# Patient Record
Sex: MALE | Race: WHITE | NOT HISPANIC OR LATINO | Employment: OTHER | URBAN - NONMETROPOLITAN AREA
[De-identification: names, ages, dates, MRNs, and addresses within clinical notes are randomized per-mention and may not be internally consistent; named-entity substitution may affect disease eponyms.]

---

## 2017-01-06 ENCOUNTER — HOSPITAL ENCOUNTER (OUTPATIENT)
Dept: OTHER | Facility: HOSPITAL | Age: 47
Setting detail: HOSPITAL OUTPATIENT SURGERY
Discharge: HOME | End: 2017-01-06
Attending: UROLOGY | Admitting: UROLOGY

## 2017-01-06 LAB
BACTERIA #/AREA URNS AUTO: ABNORMAL /[HPF]
CLARITY UR REFRACT.AUTO: CLEAR
COLOR UR AUTO: YELLOW
EPI CELLS #/AREA URNS AUTO: ABNORMAL /HPF
GLUCOSE UR STRIP.AUTO-MCNC: NEGATIVE MG/DL
HGB UR QL STRIP.AUTO: ABNORMAL
KETONES UR STRIP.AUTO-MCNC: NEGATIVE MG/DL
LEUKOCYTE ESTERASE UR QL STRIP.AUTO: NEGATIVE
NITRITE UR QL STRIP.AUTO: NEGATIVE
PH UR STRIP.AUTO: 7 PH UNITS (ref 5–9)
PROT UR STRIP.AUTO-MCNC: NEGATIVE MG/DL
RBC #/AREA URNS AUTO: ABNORMAL /HPF
SP GR UR REFRACT.AUTO: 1.02 (ref 1–1.03)
UROBILINOGEN UR STRIP-ACNC: 0.2 EU/DL
WBC # UR AUTO: ABNORMAL /HPF

## 2017-01-09 NOTE — OP NOTE
[facility]                               REPORT OF OPERATION     NAME:  VIVIANE BACK    UNIT #:  [id number]  :  1970              ACCT #:  [id number]  ROOM:  [id number]                 SURGEON:  DREAD GONZALES MD  DICTATED:  2017   TRANSCRIBED:  2017        DATE OF PROCEDURE:  2017     PREOPERATIVE DIAGNOSIS:  Right renal stone.     POSTOPERATIVE DIAGNOSIS:  Right renal stone.     PROCEDURE PERFORMED:  Right extracorporeal shock wave lithotripsy.     ASSISTANT:  Gaby Rodriguez CSA     INDICATION:  See history.     DESCRIPTION OF PROCEDURE:  The patient was brought to the operating  suite and placed in the supine position on the lithotripsy table. In AP  and oblique planes, stone was localized. Once this was accomplished, we  used a power setting ranging between 4 and 5, with 2500 shocks with good  fragmentation of the stone. Once this was accomplished, the patient was  taken back off the table and taken to recovery, having tolerated the  procedure well.        DREAD GONZALES MD  Electronically Signed  2017 15:22:43  By MD FELIZ REYES/kiersten  [id number]  cc:   DREAD GONZALES MD                            REPORT OF OPERATION  Page #page

## 2017-02-06 ENCOUNTER — PREP FOR SURGERY (OUTPATIENT)
Dept: UROLOGY | Facility: HOSPITAL | Age: 47
End: 2017-02-06

## 2017-02-06 DIAGNOSIS — N20.1 CALCULUS OF URETER: Primary | ICD-10-CM

## 2017-02-06 RX ORDER — LEVOFLOXACIN 5 MG/ML
500 INJECTION, SOLUTION INTRAVENOUS ONCE
Status: CANCELLED | OUTPATIENT
Start: 2017-02-06 | End: 2017-02-06

## 2017-02-08 NOTE — H&P
[facility] Progress Note  VIVIANE BACK  46-year-old; :1970;;male  [address] ;[place] [address]  Ins: 1) MEDICARE  Employer: UNEMPLOYED  MRN:[id number]  DREAD PERALTA MD  [facility]  2017 10:53 AM  Allergies  morphine Unknown  Current Medications  ZyrTEC 10 mg oral tablet  Flomax 0.4 mg oral capsule 1 capsule oral  nightly  * Medications Reconciled  Problem List  Ureteric stone, NOS 2017 11:50:00 AM  Medical History  Patient non-contributory.  Surgical History  ESWL 2017  Psychiatric History  Patient is generally satisfied with life. No  depression, anxiety or thoughts of suicide.  Family History  Kidney stone  Mother Alive Father Alive Brother Alive Brother  Alive Sister Alive Sister Alive  Social History  Patient does not smoke. Patient does not drink  alcohol. Patient lives with wife.  Imm/Inj/Office Meds History Comments  Chief Complaint  Post Op Visit  History of Present Illness  Patient here following RIGHT ESWL on 17. Taking Flomax. Complains of right  flank pain, intermittent and mild, relieved with back massager. He associates the pain  with starting exercise at the gym in the last few weeks. The pain does not require the  use of Percocet that Dr. Peralta prescribed. KUB today shows 9 mm stone upper right  ureter at the level of L3 pedicles, has moved 8-10 mm distally since 17. No  fever/chills/nausea/vomiting. No UTI symptoms. Able to eat, drink, exercise. Stone  analysis results given today along with education: calcium oxalate.  Complains of fatigue, gradually worsening over the last year, worried it could be his  testosterone level. Denies depression.  Location: RIGHT kidney  Severity: mild  Onset / Duration: greater than 2 weeks  Timing: intermittent  Modifying factors: ESWL  Associated signs and symptoms: no UTI symptoms. Some intermittent flank pain.  Review of Systems  Eyes: Denies: blurry vision, pain in the eyes  and double vision  ENMT: Reports: ear pain, sore throat and sinus problems  Cardiovascular: Denies: chest pain, varicose veins, angina and syncope  Respiratory: Denies: shortness of breath, wheezing and frequent cough  Gastrointestinal: Reports: vomiting and indigestionDenies: abdominal pain, nausea  and heartburn  Genitourinary: Denies: other symptoms (see HPI)  Musculoskeletal: Denies: joint pain, neck pain and back pain  Integumentary: Denies: skin rash, boils and persistent itch  Neurological: Reports: numbness / tinglingDenies: tremors and dizzy spells  Psychiatric: Reports: generally satisfied with lifeDenies: depression, suicidal ideation  and anxiety  Endocrine: Reports: cold intolerance, heat intolerance and tired/sluggishDenies:  Excessive thirst  Hematologic/Lymphatic: Denies: swollen glands and blood clotting problem  Allergic/Immunologic: Reports: hayfever  Constitutional: Denies: fever, chills and weight loss  Vital Signs  Weight: 155 (lb) BMI: 20.45  Never smoker Height: 73 (in)  Exam  General appearance: The patient appears well developed and well nourished, in no  apparent acute distress.  Examination of pupils and irises: PERRL  Assessment of hearing: Normal.  External inspection of ears and nose: Normal.  Assessment of respiratory effort: Respiratory effort appears normal. No apparent  distress.  Examination of Extremities for edema and/or varicosities: None  Exam Continued On Next Page...  Electronically Signed By DREAD GONZALES MD on 2017 10:21 AM  Generated by [employer], a product of [employer]. [email or web address] Page 1 of 2  [facility] Progress Note  VIVIANE BACK  46-year-old; :1970;;male  [address] ;[place] [address]  Ins: 1) MEDICARE  Employer: UNEMPLOYED  MRN:[id number]  DREAD GONZALES MD  [facility]  2017 10:53 AM  Exam  Examination of abdomen: Soft benign abdomen on exam.  Obtain stool sample:  Stool specimen for occult blood is not indicated.  Examination of gait and station: Normal.  Head and neck (Assessment of range of motion): Adequate ROM noted in all  extremities.  Head and neck (Assessment of stability): Ambulates without assistance.  Inspection of skin and subcutaneous tissue: Exam of the skin is within normal limits.  The color and skin turgor are normal.  Mood and affect: Mood and affect are normal.  Orientation to time, place and person: Oriented to person, place, and time.  Data Review  Medications and chart reviewed. History and physical form/ROS reviewed and no  changes noted. Previous encounter reviewed. Last form done 12/21/16. RIGHT ESWL  ON 01/06/17  Assessment - Ureteric stone, NOS  DX:  Ureteric stone, NOS  SNO: [id number], ICD-9: 592.1, ICD-10: N20.1  Fatigue  SNO: [id number], ICD-9: 780.79, ICD-10: R54  Plan  Plan Notes:  RIGHT ESWL.  Check labs: patient c/o fatigue, address after ESWL.  Procedures:  123 POST-OP VISIT  Education:  Post Surgical Instructions, General, Adult - English  Kidney Stones - English  Discussion:  Discussed my findings and plan of action and reasoning behind decision making. All  questions were answered. FINDINGS WERE DISCUSSED WITH PATIENT. RISKS  AND BENEFITS EXPLAINED. PATIENT WISHES TO PROCEED.  Instructions:  Patient is instructed to call with any problems. Patient is instructed to call if the  condition worsens. Patient is instructed to call with any changes in condition. Patient  is instructed to call if he has any intractable pain, fever, chills, nausea, or vomiting.  Electronically Signed By DREAD GONZALES MD on 02/08/2017 10:21 AM  Generated by [employer], a product of [employer]. [email or web address] Page 2 of 2

## 2017-02-16 ENCOUNTER — ANESTHESIA EVENT (OUTPATIENT)
Dept: PERIOP | Facility: HOSPITAL | Age: 47
End: 2017-02-16

## 2017-02-17 ENCOUNTER — HOSPITAL ENCOUNTER (OUTPATIENT)
Facility: HOSPITAL | Age: 47
Setting detail: HOSPITAL OUTPATIENT SURGERY
Discharge: HOME | End: 2017-02-17
Attending: UROLOGY | Admitting: UROLOGY

## 2017-02-17 ENCOUNTER — APPOINTMENT (OUTPATIENT)
Dept: GENERAL RADIOLOGY | Facility: HOSPITAL | Age: 47
End: 2017-02-17

## 2017-02-17 ENCOUNTER — ANESTHESIA (OUTPATIENT)
Dept: PERIOP | Facility: HOSPITAL | Age: 47
End: 2017-02-17

## 2017-02-17 VITALS
WEIGHT: 250.44 LBS | BODY MASS INDEX: 33.19 KG/M2 | SYSTOLIC BLOOD PRESSURE: 128 MMHG | RESPIRATION RATE: 18 BRPM | TEMPERATURE: 97.1 F | DIASTOLIC BLOOD PRESSURE: 79 MMHG | HEIGHT: 73 IN | HEART RATE: 79 BPM | OXYGEN SATURATION: 96 %

## 2017-02-17 DIAGNOSIS — N20.1 CALCULUS OF URETER: ICD-10-CM

## 2017-02-17 LAB
BILIRUB UR QL STRIP: NEGATIVE
CLARITY UR: ABNORMAL
COLOR UR: YELLOW
GLUCOSE UR STRIP-MCNC: NEGATIVE MG/DL
HGB UR QL STRIP.AUTO: NEGATIVE
KETONES UR QL STRIP: NEGATIVE
LEUKOCYTE ESTERASE UR QL STRIP.AUTO: NEGATIVE
NITRITE UR QL STRIP: NEGATIVE
PH UR STRIP.AUTO: 7.5 [PH] (ref 5–9)
PROT UR QL STRIP: NEGATIVE
SP GR UR STRIP: 1.02 (ref 1–1.03)
UROBILINOGEN UR QL STRIP: ABNORMAL

## 2017-02-17 PROCEDURE — 74000 HC ABDOMEN KUB: CPT

## 2017-02-17 PROCEDURE — 81003 URINALYSIS AUTO W/O SCOPE: CPT | Performed by: UROLOGY

## 2017-02-17 PROCEDURE — 25010000002 MIDAZOLAM PER 1 MG: Performed by: NURSE ANESTHETIST, CERTIFIED REGISTERED

## 2017-02-17 PROCEDURE — 25010000002 LEVOFLOXACIN PER 250 MG: Performed by: UROLOGY

## 2017-02-17 PROCEDURE — 25010000002 PROPOFOL 10 MG/ML EMULSION: Performed by: NURSE ANESTHETIST, CERTIFIED REGISTERED

## 2017-02-17 PROCEDURE — 25010000002 ONDANSETRON PER 1 MG: Performed by: NURSE ANESTHETIST, CERTIFIED REGISTERED

## 2017-02-17 PROCEDURE — 25010000002 FENTANYL CITRATE (PF) 100 MCG/2ML SOLUTION: Performed by: NURSE ANESTHETIST, CERTIFIED REGISTERED

## 2017-02-17 RX ORDER — MIDAZOLAM HYDROCHLORIDE 1 MG/ML
INJECTION INTRAMUSCULAR; INTRAVENOUS AS NEEDED
Status: DISCONTINUED | OUTPATIENT
Start: 2017-02-17 | End: 2017-02-17 | Stop reason: SURG

## 2017-02-17 RX ORDER — TAMSULOSIN HYDROCHLORIDE 0.4 MG/1
1 CAPSULE ORAL NIGHTLY
COMMUNITY
End: 2017-06-05

## 2017-02-17 RX ORDER — LEVOFLOXACIN 5 MG/ML
500 INJECTION, SOLUTION INTRAVENOUS ONCE
Status: COMPLETED | OUTPATIENT
Start: 2017-02-17 | End: 2017-02-17

## 2017-02-17 RX ORDER — DIPHENHYDRAMINE HYDROCHLORIDE 50 MG/ML
12.5 INJECTION INTRAMUSCULAR; INTRAVENOUS
Status: DISCONTINUED | OUTPATIENT
Start: 2017-02-17 | End: 2017-02-17 | Stop reason: HOSPADM

## 2017-02-17 RX ORDER — FLUMAZENIL 0.1 MG/ML
0.2 INJECTION INTRAVENOUS AS NEEDED
Status: CANCELLED | OUTPATIENT
Start: 2017-02-17

## 2017-02-17 RX ORDER — HYDRALAZINE HYDROCHLORIDE 20 MG/ML
5 INJECTION INTRAMUSCULAR; INTRAVENOUS
Status: CANCELLED | OUTPATIENT
Start: 2017-02-17

## 2017-02-17 RX ORDER — ONDANSETRON 2 MG/ML
4 INJECTION INTRAMUSCULAR; INTRAVENOUS ONCE AS NEEDED
Status: DISCONTINUED | OUTPATIENT
Start: 2017-02-17 | End: 2017-02-17 | Stop reason: HOSPADM

## 2017-02-17 RX ORDER — ACETAMINOPHEN 650 MG/1
650 SUPPOSITORY RECTAL ONCE AS NEEDED
Status: DISCONTINUED | OUTPATIENT
Start: 2017-02-17 | End: 2017-02-17 | Stop reason: HOSPADM

## 2017-02-17 RX ORDER — LABETALOL HYDROCHLORIDE 5 MG/ML
5 INJECTION, SOLUTION INTRAVENOUS
Status: CANCELLED | OUTPATIENT
Start: 2017-02-17

## 2017-02-17 RX ORDER — FLUMAZENIL 0.1 MG/ML
0.2 INJECTION INTRAVENOUS AS NEEDED
Status: DISCONTINUED | OUTPATIENT
Start: 2017-02-17 | End: 2017-02-17 | Stop reason: HOSPADM

## 2017-02-17 RX ORDER — SODIUM CHLORIDE, SODIUM GLUCONATE, SODIUM ACETATE, POTASSIUM CHLORIDE, AND MAGNESIUM CHLORIDE 526; 502; 368; 37; 30 MG/100ML; MG/100ML; MG/100ML; MG/100ML; MG/100ML
1000 INJECTION, SOLUTION INTRAVENOUS CONTINUOUS PRN
Status: DISCONTINUED | OUTPATIENT
Start: 2017-02-17 | End: 2017-02-17 | Stop reason: HOSPADM

## 2017-02-17 RX ORDER — LABETALOL HYDROCHLORIDE 5 MG/ML
5 INJECTION, SOLUTION INTRAVENOUS
Status: DISCONTINUED | OUTPATIENT
Start: 2017-02-17 | End: 2017-02-17 | Stop reason: HOSPADM

## 2017-02-17 RX ORDER — FENTANYL CITRATE 50 UG/ML
INJECTION, SOLUTION INTRAMUSCULAR; INTRAVENOUS AS NEEDED
Status: DISCONTINUED | OUTPATIENT
Start: 2017-02-17 | End: 2017-02-17 | Stop reason: SURG

## 2017-02-17 RX ORDER — ONDANSETRON 2 MG/ML
4 INJECTION INTRAMUSCULAR; INTRAVENOUS ONCE AS NEEDED
Status: CANCELLED | OUTPATIENT
Start: 2017-02-17

## 2017-02-17 RX ORDER — GUAIFENESIN 600 MG/1
600 TABLET, EXTENDED RELEASE ORAL DAILY
COMMUNITY
End: 2017-06-05

## 2017-02-17 RX ORDER — ACETAMINOPHEN 325 MG/1
650 TABLET ORAL ONCE AS NEEDED
Status: CANCELLED | OUTPATIENT
Start: 2017-02-17

## 2017-02-17 RX ORDER — PROPOFOL 10 MG/ML
VIAL (ML) INTRAVENOUS AS NEEDED
Status: DISCONTINUED | OUTPATIENT
Start: 2017-02-17 | End: 2017-02-17 | Stop reason: SURG

## 2017-02-17 RX ORDER — NALOXONE HCL 0.4 MG/ML
0.2 VIAL (ML) INJECTION AS NEEDED
Status: DISCONTINUED | OUTPATIENT
Start: 2017-02-17 | End: 2017-02-17 | Stop reason: HOSPADM

## 2017-02-17 RX ORDER — ACETAMINOPHEN 650 MG/1
650 SUPPOSITORY RECTAL ONCE AS NEEDED
Status: CANCELLED | OUTPATIENT
Start: 2017-02-17

## 2017-02-17 RX ORDER — DIPHENHYDRAMINE HYDROCHLORIDE 50 MG/ML
12.5 INJECTION INTRAMUSCULAR; INTRAVENOUS
Status: CANCELLED | OUTPATIENT
Start: 2017-02-17

## 2017-02-17 RX ORDER — LIDOCAINE HYDROCHLORIDE 20 MG/ML
INJECTION, SOLUTION INFILTRATION; PERINEURAL AS NEEDED
Status: DISCONTINUED | OUTPATIENT
Start: 2017-02-17 | End: 2017-02-17 | Stop reason: SURG

## 2017-02-17 RX ORDER — NALOXONE HCL 0.4 MG/ML
0.2 VIAL (ML) INJECTION AS NEEDED
Status: CANCELLED | OUTPATIENT
Start: 2017-02-17

## 2017-02-17 RX ORDER — METOCLOPRAMIDE HYDROCHLORIDE 5 MG/ML
10 INJECTION INTRAMUSCULAR; INTRAVENOUS ONCE AS NEEDED
Status: DISCONTINUED | OUTPATIENT
Start: 2017-02-17 | End: 2017-02-17 | Stop reason: HOSPADM

## 2017-02-17 RX ORDER — ACETAMINOPHEN 325 MG/1
650 TABLET ORAL ONCE AS NEEDED
Status: DISCONTINUED | OUTPATIENT
Start: 2017-02-17 | End: 2017-02-17 | Stop reason: HOSPADM

## 2017-02-17 RX ORDER — MORPHINE SULFATE 2 MG/ML
2 INJECTION, SOLUTION INTRAMUSCULAR; INTRAVENOUS
Status: CANCELLED | OUTPATIENT
Start: 2017-02-17 | End: 2017-02-17

## 2017-02-17 RX ORDER — HYDRALAZINE HYDROCHLORIDE 20 MG/ML
5 INJECTION INTRAMUSCULAR; INTRAVENOUS
Status: DISCONTINUED | OUTPATIENT
Start: 2017-02-17 | End: 2017-02-17 | Stop reason: HOSPADM

## 2017-02-17 RX ORDER — ONDANSETRON 2 MG/ML
INJECTION INTRAMUSCULAR; INTRAVENOUS AS NEEDED
Status: DISCONTINUED | OUTPATIENT
Start: 2017-02-17 | End: 2017-02-17 | Stop reason: SURG

## 2017-02-17 RX ORDER — METOCLOPRAMIDE HYDROCHLORIDE 5 MG/ML
10 INJECTION INTRAMUSCULAR; INTRAVENOUS ONCE AS NEEDED
Status: CANCELLED | OUTPATIENT
Start: 2017-02-17

## 2017-02-17 RX ORDER — DEXAMETHASONE SODIUM PHOSPHATE 4 MG/ML
8 INJECTION, SOLUTION INTRA-ARTICULAR; INTRALESIONAL; INTRAMUSCULAR; INTRAVENOUS; SOFT TISSUE ONCE AS NEEDED
Status: DISCONTINUED | OUTPATIENT
Start: 2017-02-17 | End: 2017-02-17 | Stop reason: HOSPADM

## 2017-02-17 RX ADMIN — PROPOFOL 200 MG: 10 INJECTION, EMULSION INTRAVENOUS at 16:00

## 2017-02-17 RX ADMIN — LIDOCAINE HYDROCHLORIDE 100 MG: 20 INJECTION, SOLUTION INFILTRATION; PERINEURAL at 16:00

## 2017-02-17 RX ADMIN — ONDANSETRON 4 MG: 2 INJECTION INTRAMUSCULAR; INTRAVENOUS at 16:00

## 2017-02-17 RX ADMIN — FENTANYL CITRATE 25 MCG: 50 INJECTION, SOLUTION INTRAMUSCULAR; INTRAVENOUS at 16:00

## 2017-02-17 RX ADMIN — MIDAZOLAM 2 MG: 1 INJECTION INTRAMUSCULAR; INTRAVENOUS at 15:52

## 2017-02-17 RX ADMIN — LEVOFLOXACIN 500 MG: 5 INJECTION, SOLUTION INTRAVENOUS at 15:59

## 2017-02-17 RX ADMIN — SODIUM CHLORIDE, SODIUM GLUCONATE, SODIUM ACETATE, POTASSIUM CHLORIDE, AND MAGNESIUM CHLORIDE 1000 ML: 526; 502; 368; 37; 30 INJECTION, SOLUTION INTRAVENOUS at 14:08

## 2017-02-17 NOTE — ANESTHESIA PREPROCEDURE EVALUATION
Anesthesia Evaluation     Patient summary reviewed and Nursing notes reviewed   NPO Status: > 8 hours   Airway   Mallampati: III  no difficulty expected  Dental      Pulmonary - negative pulmonary ROS    breath sounds clear to auscultation  Cardiovascular - negative cardio ROS    Rhythm: regular        Neuro/Psych- negative ROS  GI/Hepatic/Renal/Endo    (+) obesity,      ROS Comment: uretereal calculus    Musculoskeletal (-) negative ROS    Abdominal    Substance History - negative use     OB/GYN negative ob/gyn ROS         Other                                    Anesthesia Plan    ASA 2     general     intravenous induction   Anesthetic plan and risks discussed with patient.

## 2017-02-17 NOTE — PLAN OF CARE
Problem: Patient Care Overview (Adult)  Goal: Plan of Care Review  Outcome: Outcome(s) achieved Date Met:  02/17/17  Discharge criteria met    Problem: Perioperative Period (Adult)  Goal: Signs and Symptoms of Listed Potential Problems Will be Absent or Manageable (Perioperative Period)  Outcome: Outcome(s) achieved Date Met:  02/17/17

## 2017-02-17 NOTE — OP NOTE
Preoperative diagnosis:  Right proximal ureteral stone 8 mm     Postop diagnosis:  Same     Procedure : Right ESWL    Surgeon:  Brayan Peralta     Anesthesia: General     Indications: See history    Procedure:  Patient was brought to the operating room and placed on the lithotripsy table.  With the fluoroscopy AP and lateral planes on the lithotripsy we found the stone and applied 3000 shocks at a power setting ranging between 4 and 6 with good fragmentation of the stone.  The patient tolerated the procedure well.  At termination of this patient was taken back off the table back to recovery having tolerated the procedure well.     Brayan Peralta M.D.

## 2017-02-17 NOTE — ANESTHESIA POSTPROCEDURE EVALUATION
Patient: Darvin Hoang    Procedure Summary     Date Anesthesia Start Anesthesia Stop Room / Location    02/17/17 1554  [facility] OR 06 / [facility] OR       Procedure Diagnosis Surgeon Provider    RIGHT EXTRACORPOREAL SHOCKWAVE LITHOTRIPSY (Right ) Calculus of ureter  (Calculus of ureter [N20.1]) MD Eitan Juarez MD          Anesthesia Type: general  Last vitals  BP      Temp      Pulse     Resp      SpO2        Post Anesthesia Care and Evaluation    Patient location during evaluation: bedside  Patient participation: complete - patient cannot participate  Level of consciousness: responsive to verbal stimuli  Pain management: adequate  Airway patency: patent  Anesthetic complications: No anesthetic complications    Cardiovascular status: acceptable  Respiratory status: acceptable  Hydration status: acceptable

## 2017-02-17 NOTE — BRIEF OP NOTE
EXTRACORPOREAL SHOCKWAVE LITHOTRIPSY WITH STENT INSERTION/REMOVAL  Procedure Note    Darvin Hoang  2/17/2017    Pre-op Diagnosis:   Calculus of ureter [N20.1]    Post-op Diagnosis:     Post-Op Diagnosis Codes:     * Calculus of ureter [N20.1]    Procedure/CPT® Codes:      Procedure(s):  RIGHT EXTRACORPOREAL SHOCKWAVE LITHOTRIPSY    Surgeon(s):  Brayan Peralta MD    Anesthesia: General    Staff:   Circulator: hSaila Ordoñez RN  Scrub Person: Jocelyn Moreira    Estimated Blood Loss: * No values recorded between 2/17/2017  3:53 PM and 2/17/2017  4:33 PM *    Specimens:                * No specimens in log *      Drains:           Findings: Same    Complications: None      Brayan Peralta MD     Date: 2/17/2017  Time: 4:51 PM

## 2017-02-17 NOTE — ANESTHESIA PROCEDURE NOTES
Airway  Urgency: elective    Airway not difficult    General Information and Staff    Patient location during procedure: OR  CRNA: DONA MURPHY    Indications and Patient Condition  Indications for airway management: airway protection    Preoxygenated: yes  MILS maintained throughout  Mask difficulty assessment: 0 - not attempted    Final Airway Details  Final airway type: supraglottic airway      Successful airway: classic  Size 4    Number of attempts at approach: 1

## 2017-02-17 NOTE — PLAN OF CARE
Problem: Patient Care Overview (Adult)  Goal: Plan of Care Review  Outcome: Ongoing (interventions implemented as appropriate)    02/17/17 2378   Coping/Psychosocial Response Interventions   Plan Of Care Reviewed With patient   Patient Care Overview   Progress improving   Outcome Evaluation   Outcome Summary/Follow up Plan pt awake, VSS on room air, denies having pain or nausea, ready for phase II.         Problem: Perioperative Period (Adult)  Goal: Signs and Symptoms of Listed Potential Problems Will be Absent or Manageable (Perioperative Period)  Outcome: Ongoing (interventions implemented as appropriate)

## 2017-03-30 ENCOUNTER — CLINICAL SUPPORT (OUTPATIENT)
Dept: PULMONOLOGY | Facility: CLINIC | Age: 47
End: 2017-03-30

## 2017-03-30 DIAGNOSIS — J30.89 ENVIRONMENTAL AND SEASONAL ALLERGIES: Primary | ICD-10-CM

## 2017-03-30 PROCEDURE — 95165 ANTIGEN THERAPY SERVICES: CPT | Performed by: INTERNAL MEDICINE

## 2017-06-05 ENCOUNTER — OFFICE VISIT (OUTPATIENT)
Dept: PULMONOLOGY | Facility: CLINIC | Age: 47
End: 2017-06-05

## 2017-06-05 VITALS
OXYGEN SATURATION: 96 % | WEIGHT: 232 LBS | BODY MASS INDEX: 30.75 KG/M2 | HEART RATE: 81 BPM | DIASTOLIC BLOOD PRESSURE: 77 MMHG | HEIGHT: 73 IN | SYSTOLIC BLOOD PRESSURE: 120 MMHG

## 2017-06-05 DIAGNOSIS — J30.2 SEASONAL ALLERGIC RHINITIS, UNSPECIFIED ALLERGIC RHINITIS TRIGGER: Primary | ICD-10-CM

## 2017-06-05 PROCEDURE — 99213 OFFICE O/P EST LOW 20 MIN: CPT | Performed by: INTERNAL MEDICINE

## 2017-06-05 NOTE — PROGRESS NOTES
"This gentleman has long-standing allergic rhinitis.  He takes immunotherapy in his symptoms are somewhat worse than usual.  He is not producing purulent sputum    ROS    Constitutional-no night sweats weight loss headaches  GI no abdominal pain nausea or diarrhea  Neuro no seizure or neurologic deficits  Musculoskeletal no deformity or joint pain   no dysuria or hematuria  Skin no rash or other lesions  All other systems reviewed and were negative except for the above.      Physical Exam  /77 (BP Location: Left arm, Patient Position: Sitting, Cuff Size: Adult)  Pulse 81  Ht 73\" (185.4 cm)  Wt 232 lb (105 kg)  SpO2 96%  BMI 30.61 kg/m2  Vital signs as above  Pupils equally round and reactive to light and accommodation, neck no JVD or adenopathy.  Cardiovascular regular rhythm and rate no murmur or gallop.  Abdomen soft no organomegaly tenderness.  Extremities no clubbing cyanosis or edema.  No cervical adenopathy.  No skin rash.  Neurologic good strength bilaterally without deficits  Nose is mildly congested throat and lungs are clear    Impression allergic rhinitis    Plan continue present medications, continue immunotherapy, return in a year  "

## 2017-06-06 ENCOUNTER — CLINICAL SUPPORT (OUTPATIENT)
Dept: PULMONOLOGY | Facility: CLINIC | Age: 47
End: 2017-06-06

## 2017-06-06 DIAGNOSIS — J30.2 SEASONAL ALLERGIC RHINITIS, UNSPECIFIED ALLERGIC RHINITIS TRIGGER: ICD-10-CM

## 2017-06-06 PROCEDURE — 95165 ANTIGEN THERAPY SERVICES: CPT | Performed by: INTERNAL MEDICINE

## 2017-06-06 NOTE — PROGRESS NOTES
We mailed the patient 10 units of allergy serum, patient is to call 10 days prior to needing next refill.

## 2017-08-15 ENCOUNTER — TELEPHONE (OUTPATIENT)
Dept: PULMONOLOGY | Facility: CLINIC | Age: 47
End: 2017-08-15

## 2017-08-15 ENCOUNTER — OFFICE VISIT (OUTPATIENT)
Dept: PULMONOLOGY | Facility: CLINIC | Age: 47
End: 2017-08-15

## 2017-08-15 VITALS
SYSTOLIC BLOOD PRESSURE: 121 MMHG | DIASTOLIC BLOOD PRESSURE: 79 MMHG | HEIGHT: 73 IN | HEART RATE: 95 BPM | OXYGEN SATURATION: 96 %

## 2017-08-15 DIAGNOSIS — J01.00 ACUTE NON-RECURRENT MAXILLARY SINUSITIS: Primary | ICD-10-CM

## 2017-08-15 DIAGNOSIS — J30.2 SEASONAL ALLERGIC RHINITIS, UNSPECIFIED ALLERGIC RHINITIS TRIGGER: ICD-10-CM

## 2017-08-15 PROCEDURE — 96372 THER/PROPH/DIAG INJ SC/IM: CPT | Performed by: INTERNAL MEDICINE

## 2017-08-15 PROCEDURE — 99213 OFFICE O/P EST LOW 20 MIN: CPT | Performed by: INTERNAL MEDICINE

## 2017-08-15 RX ORDER — PREDNISONE 10 MG/1
TABLET ORAL
Qty: 21 TABLET | Refills: 0 | Status: SHIPPED | OUTPATIENT
Start: 2017-08-15 | End: 2017-10-25

## 2017-08-15 RX ORDER — AMOXICILLIN AND CLAVULANATE POTASSIUM 875; 125 MG/1; MG/1
TABLET, FILM COATED ORAL
Qty: 20 TABLET | Refills: 0 | Status: SHIPPED | OUTPATIENT
Start: 2017-08-15 | End: 2017-10-25

## 2017-08-15 RX ORDER — METHYLPREDNISOLONE ACETATE 40 MG/ML
80 INJECTION, SUSPENSION INTRA-ARTICULAR; INTRALESIONAL; INTRAMUSCULAR; SOFT TISSUE ONCE
Status: COMPLETED | OUTPATIENT
Start: 2017-08-15 | End: 2017-08-15

## 2017-08-15 RX ADMIN — METHYLPREDNISOLONE ACETATE 80 MG: 40 INJECTION, SUSPENSION INTRA-ARTICULAR; INTRALESIONAL; INTRAMUSCULAR; SOFT TISSUE at 14:31

## 2017-08-15 NOTE — PROGRESS NOTES
"This gentleman has long-standing allergic rhinitis.  He's had some local reactions to immunotherapy.  He has had swelling of his arm and rash with no difficulty breathing or swallowing.  He has noted purulent nasal drainage facial discomfort for several days    ROS    Constitutional-no night sweats weight loss headaches  GI no abdominal pain nausea or diarrhea  Neuro no seizure or neurologic deficits  Musculoskeletal no deformity or joint pain   no dysuria or hematuria  Skin no rash or other lesions  All other systems reviewed and were negative except for the above.      Physical Exam  /79  Pulse 95  Ht 73\" (185.4 cm)  SpO2 96%  Vital signs as above  Pupils equally round and reactive to light and accommodation, neck no JVD or adenopathy.  Cardiovascular regular rhythm and rate no murmur or gallop.  Abdomen soft no organomegaly tenderness.  Extremities no clubbing cyanosis or edema.  No cervical adenopathy.  No skin rash.  Neurologic good strength bilaterally without deficits  Nose is congested mild percussion tenderness over the maxillary sinuses  Lungs are clear    Impression allergic rhinitis, reactions to immunotherapy, acute maxillary sinusitis, exacerbation of allergic rhinitis    Plan Depo-Medrol, prednisone, Augmentin, reduce immunotherapy to dilution to, return in a few weeks  "

## 2017-08-15 NOTE — TELEPHONE ENCOUNTER
Pt called about his allergy serum he had questions about why he went from red top to blue top I explained to the pt why he was getting the blue top and he says since getting the blue top he has been having rashes after the shot the pt is coming in to be seen and will discuss allergy serum with pt.

## 2017-08-16 ENCOUNTER — CLINICAL SUPPORT (OUTPATIENT)
Dept: PULMONOLOGY | Facility: CLINIC | Age: 47
End: 2017-08-16

## 2017-08-16 DIAGNOSIS — J30.2 SEASONAL ALLERGIC RHINITIS, UNSPECIFIED ALLERGIC RHINITIS TRIGGER: ICD-10-CM

## 2017-08-16 PROCEDURE — 95165 ANTIGEN THERAPY SERVICES: CPT | Performed by: INTERNAL MEDICINE

## 2017-10-18 ENCOUNTER — CLINICAL SUPPORT (OUTPATIENT)
Dept: PULMONOLOGY | Facility: CLINIC | Age: 47
End: 2017-10-18

## 2017-10-18 DIAGNOSIS — J30.2 SEASONAL ALLERGIC RHINITIS, UNSPECIFIED CHRONICITY, UNSPECIFIED TRIGGER: ICD-10-CM

## 2017-10-18 PROCEDURE — 95165 ANTIGEN THERAPY SERVICES: CPT | Performed by: INTERNAL MEDICINE

## 2017-10-25 ENCOUNTER — OFFICE VISIT (OUTPATIENT)
Dept: PULMONOLOGY | Facility: CLINIC | Age: 47
End: 2017-10-25

## 2017-10-25 VITALS
BODY MASS INDEX: 30.72 KG/M2 | HEIGHT: 73 IN | HEART RATE: 74 BPM | WEIGHT: 231.8 LBS | DIASTOLIC BLOOD PRESSURE: 82 MMHG | OXYGEN SATURATION: 98 % | SYSTOLIC BLOOD PRESSURE: 128 MMHG

## 2017-10-25 DIAGNOSIS — J30.89 CHRONIC NON-SEASONAL ALLERGIC RHINITIS, UNSPECIFIED TRIGGER: ICD-10-CM

## 2017-10-25 DIAGNOSIS — J01.01 ACUTE RECURRENT MAXILLARY SINUSITIS: Primary | ICD-10-CM

## 2017-10-25 PROCEDURE — 96372 THER/PROPH/DIAG INJ SC/IM: CPT | Performed by: INTERNAL MEDICINE

## 2017-10-25 PROCEDURE — 99213 OFFICE O/P EST LOW 20 MIN: CPT | Performed by: INTERNAL MEDICINE

## 2017-10-25 RX ORDER — METHYLPREDNISOLONE ACETATE 80 MG/ML
80 INJECTION, SUSPENSION INTRA-ARTICULAR; INTRALESIONAL; INTRAMUSCULAR; SOFT TISSUE ONCE
Status: COMPLETED | OUTPATIENT
Start: 2017-10-25 | End: 2017-10-25

## 2017-10-25 RX ORDER — PREDNISONE 10 MG/1
TABLET ORAL
Qty: 21 TABLET | Refills: 0 | Status: SHIPPED | OUTPATIENT
Start: 2017-10-25 | End: 2018-06-14

## 2017-10-25 RX ORDER — METHYLPREDNISOLONE ACETATE 80 MG/ML
80 INJECTION, SUSPENSION INTRA-ARTICULAR; INTRALESIONAL; INTRAMUSCULAR; SOFT TISSUE ONCE
Status: DISCONTINUED | OUTPATIENT
Start: 2017-10-25 | End: 2017-10-25

## 2017-10-25 RX ORDER — AZITHROMYCIN 250 MG/1
TABLET, FILM COATED ORAL
Qty: 6 TABLET | Refills: 1 | Status: SHIPPED | OUTPATIENT
Start: 2017-10-25 | End: 2018-01-19

## 2017-10-25 RX ADMIN — METHYLPREDNISOLONE ACETATE 80 MG: 80 INJECTION, SUSPENSION INTRA-ARTICULAR; INTRALESIONAL; INTRAMUSCULAR; SOFT TISSUE at 10:23

## 2017-10-25 NOTE — PROGRESS NOTES
"This gentleman complains of facial discomfort and purulent nasal drainage for several days    ROS    Constitutional-no night sweats weight loss headaches  GI no abdominal pain nausea or diarrhea  Neuro no seizure or neurologic deficits  Musculoskeletal no deformity or joint pain   no dysuria or hematuria  Skin no rash or other lesions  All other systems reviewed and were negative except for the above.      Physical Exam  /82 (BP Location: Right arm, Patient Position: Sitting, Cuff Size: Adult)  Pulse 74  Ht 73\" (185.4 cm)  Wt 231 lb 12.8 oz (105 kg)  SpO2 98%  BMI 30.58 kg/m2  Vital signs as above  Pupils equally round and reactive to light and accommodation, neck no JVD or adenopathy.  Cardiovascular regular rhythm and rate no murmur or gallop.  Abdomen soft no organomegaly tenderness.  Extremities no clubbing cyanosis or edema.  No cervical adenopathy.  No skin rash.  Neurologic good strength bilaterally without deficits  Nose is congested, throat injected, percussion tenderness over both maxillary sinuses, lungs are clear    Impression allergic rhinitis with bilateral maxillary sinusitis    Plan Depo-Medrol, Zithromax, prednisone, continue routine meds, return as needed          This document has been electronically signed by Trenton Romero MD on October 25, 2017 10:05 AM      "

## 2017-11-17 ENCOUNTER — TELEPHONE (OUTPATIENT)
Dept: PULMONOLOGY | Facility: CLINIC | Age: 47
End: 2017-11-17

## 2017-11-21 ENCOUNTER — CLINICAL SUPPORT (OUTPATIENT)
Dept: PULMONOLOGY | Facility: CLINIC | Age: 47
End: 2017-11-21

## 2017-11-21 DIAGNOSIS — J30.2 SEASONAL ALLERGIC RHINITIS, UNSPECIFIED CHRONICITY, UNSPECIFIED TRIGGER: ICD-10-CM

## 2017-11-21 PROCEDURE — 95165 ANTIGEN THERAPY SERVICES: CPT | Performed by: INTERNAL MEDICINE

## 2018-01-19 ENCOUNTER — OFFICE VISIT (OUTPATIENT)
Dept: PULMONOLOGY | Facility: CLINIC | Age: 48
End: 2018-01-19

## 2018-01-19 VITALS
HEART RATE: 74 BPM | OXYGEN SATURATION: 95 % | SYSTOLIC BLOOD PRESSURE: 135 MMHG | DIASTOLIC BLOOD PRESSURE: 88 MMHG | HEIGHT: 73 IN | WEIGHT: 240.8 LBS | BODY MASS INDEX: 31.91 KG/M2

## 2018-01-19 DIAGNOSIS — J30.89 CHRONIC NON-SEASONAL ALLERGIC RHINITIS, UNSPECIFIED TRIGGER: Primary | ICD-10-CM

## 2018-01-19 DIAGNOSIS — J01.00 SUBACUTE MAXILLARY SINUSITIS: ICD-10-CM

## 2018-01-19 DIAGNOSIS — H66.005 RECURRENT ACUTE SUPPURATIVE OTITIS MEDIA WITHOUT SPONTANEOUS RUPTURE OF LEFT TYMPANIC MEMBRANE: ICD-10-CM

## 2018-01-19 PROCEDURE — 96372 THER/PROPH/DIAG INJ SC/IM: CPT | Performed by: INTERNAL MEDICINE

## 2018-01-19 PROCEDURE — 99213 OFFICE O/P EST LOW 20 MIN: CPT | Performed by: INTERNAL MEDICINE

## 2018-01-19 RX ORDER — AMOXICILLIN AND CLAVULANATE POTASSIUM 875; 125 MG/1; MG/1
TABLET, FILM COATED ORAL
Qty: 20 TABLET | Refills: 1 | Status: SHIPPED | OUTPATIENT
Start: 2018-01-19 | End: 2018-04-16

## 2018-01-19 RX ORDER — METHYLPREDNISOLONE ACETATE 40 MG/ML
80 INJECTION, SUSPENSION INTRA-ARTICULAR; INTRALESIONAL; INTRAMUSCULAR; SOFT TISSUE ONCE
Status: COMPLETED | OUTPATIENT
Start: 2018-01-19 | End: 2018-01-19

## 2018-01-19 RX ORDER — PREDNISONE 10 MG/1
TABLET ORAL
Qty: 21 TABLET | Refills: 0 | Status: SHIPPED | OUTPATIENT
Start: 2018-01-19 | End: 2018-06-14

## 2018-01-19 RX ADMIN — METHYLPREDNISOLONE ACETATE 80 MG: 40 INJECTION, SUSPENSION INTRA-ARTICULAR; INTRALESIONAL; INTRAMUSCULAR; SOFT TISSUE at 15:05

## 2018-01-19 NOTE — PROGRESS NOTES
"This gentleman has allergic rhinitis with recurring episodes of purulent rhinitis sinusitis and otitis.  He complains of purulent nasal drainage facial discomfort and pain in his left ear for several days    ROS    Constitutional-no night sweats weight loss headaches  GI no abdominal pain nausea or diarrhea  Neuro no seizure or neurologic deficits  Musculoskeletal no deformity or joint pain   no dysuria or hematuria  Skin no rash or other lesions  All other systems reviewed and were negative except for the above.      Physical Exam  /88 (BP Location: Left arm, Patient Position: Sitting, Cuff Size: Adult)  Pulse 74  Ht 185.4 cm (73\")  Wt 109 kg (240 lb 12.8 oz)  SpO2 95%  BMI 31.77 kg/m2  Vital signs as above  Pupils equally round and reactive to light and accommodation, neck no JVD or adenopathy.  Cardiovascular regular rhythm and rate no murmur or gallop.  Abdomen soft no organomegaly tenderness.  Extremities no clubbing cyanosis or edema.  No cervical adenopathy.  No skin rash.  Neurologic good strength bilaterally without deficits  Nose throat are injected and congested, left eardrum reveals inflammation    Impression allergic rhinitis exacerbation, maxillary sinusitis, acute otitis media    Plan Depo-Medrol, Augmentin, prednisone, return as needed          This document has been electronically signed by Trenton Romero MD on January 19, 2018 2:39 PM      "

## 2018-04-16 ENCOUNTER — OFFICE VISIT (OUTPATIENT)
Dept: PULMONOLOGY | Facility: CLINIC | Age: 48
End: 2018-04-16

## 2018-04-16 ENCOUNTER — APPOINTMENT (OUTPATIENT)
Dept: LAB | Facility: HOSPITAL | Age: 48
End: 2018-04-16

## 2018-04-16 VITALS
HEART RATE: 67 BPM | HEIGHT: 73 IN | WEIGHT: 238.2 LBS | BODY MASS INDEX: 31.57 KG/M2 | OXYGEN SATURATION: 96 % | DIASTOLIC BLOOD PRESSURE: 80 MMHG | SYSTOLIC BLOOD PRESSURE: 126 MMHG

## 2018-04-16 DIAGNOSIS — J30.1 ALLERGIC RHINITIS DUE TO POLLEN, UNSPECIFIED CHRONICITY, UNSPECIFIED SEASONALITY: Primary | ICD-10-CM

## 2018-04-16 PROCEDURE — 86003 ALLG SPEC IGE CRUDE XTRC EA: CPT | Performed by: INTERNAL MEDICINE

## 2018-04-16 PROCEDURE — 36415 COLL VENOUS BLD VENIPUNCTURE: CPT | Performed by: INTERNAL MEDICINE

## 2018-04-16 PROCEDURE — 96372 THER/PROPH/DIAG INJ SC/IM: CPT | Performed by: INTERNAL MEDICINE

## 2018-04-16 PROCEDURE — 99214 OFFICE O/P EST MOD 30 MIN: CPT | Performed by: INTERNAL MEDICINE

## 2018-04-16 RX ORDER — BETAMETHASONE SODIUM PHOSPHATE AND BETAMETHASONE ACETATE 3; 3 MG/ML; MG/ML
6 INJECTION, SUSPENSION INTRA-ARTICULAR; INTRALESIONAL; INTRAMUSCULAR; SOFT TISSUE ONCE
Status: COMPLETED | OUTPATIENT
Start: 2018-04-16 | End: 2018-04-16

## 2018-04-16 RX ADMIN — BETAMETHASONE SODIUM PHOSPHATE AND BETAMETHASONE ACETATE 6 MG: 3; 3 INJECTION, SUSPENSION INTRA-ARTICULAR; INTRALESIONAL; INTRAMUSCULAR; SOFT TISSUE at 10:11

## 2018-04-16 NOTE — PROGRESS NOTES
"This gentleman has long-standing allergic rhinitis complains of sneezing and nasal congestion for several days.  He is not producing purulent sputum.  He takes immunotherapy.  He believes the immunotherapy is helping.  He denies cough and wheeze    ROS    Constitutional-no night sweats weight loss headaches  GI no abdominal pain nausea or diarrhea  Neuro no seizure or neurologic deficits  Musculoskeletal no deformity or joint pain   no dysuria or hematuria  Skin no rash or other lesions  All other systems reviewed and were negative except for the above.      Physical Exam  /80 (BP Location: Right arm, Patient Position: Sitting, Cuff Size: Adult)   Pulse 67   Ht 185.4 cm (73\")   Wt 108 kg (238 lb 3.2 oz)   SpO2 96%   BMI 31.43 kg/m²   Vital signs as above  Pupils equally round and reactive to light and accommodation, neck no JVD or adenopathy.  Cardiovascular regular rhythm and rate no murmur or gallop.  Abdomen soft no organomegaly tenderness.  Extremities no clubbing cyanosis or edema.  No cervical adenopathy.  No skin rash.  Neurologic good strength bilaterally without deficits  Nose is congested throat is mildly injected inferior turbinates are boggy, lungs are clear    Impression allergic rhinitis exacerbation    Plan continue immunotherapy, allergy blood test, Celestone 2 cc IM, will mix  Immunotherapy based on his blood test, return in 6 months or sooner          This document has been electronically signed by Trenton Romero MD on April 16, 2018 10:00 AM      "

## 2018-04-21 LAB
A ALTERNATA IGE QN: 29.5 KU/L
A FUMIGATUS IGE QN: 12.2 KU/L
AMER ROACH IGE QN: 0.22 KU/L
BAHIA GRASS IGE QN: 0.36 KU/L
BAYBERRY POLN IGE QN: 0.22 KU/L
BERMUDA GRASS IGE QN: 0.22 KU/L
BOXELDER IGE QN: 0.35 KU/L
C HERBARUM IGE QN: 10 KU/L
CAT DANDER IGG QN: 1.92 KU/L
COMMON RAGWEED IGE QN: 0.44 KU/L
CONV CLASS DESCRIPTION: ABNORMAL
D FARINAE IGE QN: 11.4 KU/L
D PTERONYSS IGE QN: 11.5 KU/L
DOG DANDER IGE QN: 1.06 KU/L
DOG FENNEL IGE QN: 0.22 KU/L
ENGL PLANTAIN IGE QN: 0.28 KU/L
GOOSEFOOT IGE QN: 0.38 KU/L
GUM-TREE IGE QN: 0.18 KU/L
ITALIAN CYPRESS IGE QN: 0.19 KU/L
JOHNSON GRASS IGE QN: 0.27 KU/L
M RACEMOSUS IGE QN: 6.74 KU/L
P NOTATUM IGE QN: 3.05 KU/L
PEPPER TREE IGE QN: 0.38 KU/L
PER RYE GRASS IGE QN: 1.17 KU/L
QUEEN PALM IGE QN: 0.11 KU/L
S BOTRYOSUM IGE QN: 19.5 KU/L
SHEEP SORREL IGE QN: 0.23 KU/L
T210-IGE PRIVET, COMMON: 0.27 KU/L
VIRG LIVE OAK IGE QN: 0.14 KU/L
WHITE ELM IGE QN: 0.25 KU/L

## 2018-05-01 ENCOUNTER — OFFICE VISIT (OUTPATIENT)
Dept: PULMONOLOGY | Facility: CLINIC | Age: 48
End: 2018-05-01

## 2018-05-01 VITALS
HEART RATE: 93 BPM | SYSTOLIC BLOOD PRESSURE: 122 MMHG | OXYGEN SATURATION: 96 % | HEIGHT: 73 IN | DIASTOLIC BLOOD PRESSURE: 76 MMHG

## 2018-05-01 DIAGNOSIS — J30.1 ACUTE ALLERGIC RHINITIS DUE TO POLLEN, UNSPECIFIED SEASONALITY: Primary | ICD-10-CM

## 2018-05-01 PROCEDURE — 99213 OFFICE O/P EST LOW 20 MIN: CPT | Performed by: INTERNAL MEDICINE

## 2018-05-01 NOTE — PROGRESS NOTES
"This gentleman has long-standing allergic rhinitis and would like to continue immunotherapy he has sneezing and nasal congestion    ROS    Constitutional-no night sweats weight loss headaches  GI no abdominal pain nausea or diarrhea  Neuro no seizure or neurologic deficits  Musculoskeletal no deformity or joint pain   no dysuria or hematuria  Skin no rash or other lesions  All other systems reviewed and were negative except for the above.      Physical Exam  /76   Pulse 93   Ht 185.4 cm (73\")   SpO2 96%   Vital signs as above  Pupils equally round and reactive to light and accommodation, neck no JVD or adenopathy.  Cardiovascular regular rhythm and rate no murmur or gallop.  Abdomen soft no organomegaly tenderness.  Extremities no clubbing cyanosis or edema.  No cervical adenopathy.  No skin rash.  Neurologic good strength bilaterally without deficits  Nose throat and lungs are clear    Impression allergic rhinitis    Plan restart immunotherapy based on new max    Return in 3 months          This document has been electronically signed by Trenton Romero MD on May 1, 2018 3:34 PM      "

## 2018-06-13 ENCOUNTER — CLINICAL SUPPORT (OUTPATIENT)
Dept: PULMONOLOGY | Facility: CLINIC | Age: 48
End: 2018-06-13

## 2018-06-13 DIAGNOSIS — J30.2 SEASONAL ALLERGIC RHINITIS, UNSPECIFIED CHRONICITY, UNSPECIFIED TRIGGER: Primary | ICD-10-CM

## 2018-06-13 DIAGNOSIS — J30.2 SEASONAL ALLERGIC RHINITIS, UNSPECIFIED TRIGGER: ICD-10-CM

## 2018-06-13 PROCEDURE — 95115 IMMUNOTHERAPY ONE INJECTION: CPT | Performed by: INTERNAL MEDICINE

## 2018-06-13 PROCEDURE — 95165 ANTIGEN THERAPY SERVICES: CPT | Performed by: INTERNAL MEDICINE

## 2018-06-14 ENCOUNTER — OFFICE VISIT (OUTPATIENT)
Dept: PULMONOLOGY | Facility: CLINIC | Age: 48
End: 2018-06-14

## 2018-06-14 VITALS
SYSTOLIC BLOOD PRESSURE: 132 MMHG | DIASTOLIC BLOOD PRESSURE: 86 MMHG | OXYGEN SATURATION: 98 % | WEIGHT: 242.4 LBS | HEART RATE: 103 BPM | HEIGHT: 73 IN | BODY MASS INDEX: 32.13 KG/M2

## 2018-06-14 DIAGNOSIS — J31.0 PURULENT RHINITIS: ICD-10-CM

## 2018-06-14 DIAGNOSIS — J20.9 ACUTE BRONCHITIS, UNSPECIFIED ORGANISM: ICD-10-CM

## 2018-06-14 DIAGNOSIS — J30.1 ACUTE ALLERGIC RHINITIS DUE TO POLLEN, UNSPECIFIED SEASONALITY: Primary | ICD-10-CM

## 2018-06-14 PROCEDURE — 99214 OFFICE O/P EST MOD 30 MIN: CPT | Performed by: INTERNAL MEDICINE

## 2018-06-14 PROCEDURE — 96372 THER/PROPH/DIAG INJ SC/IM: CPT | Performed by: INTERNAL MEDICINE

## 2018-06-14 RX ORDER — BETAMETHASONE SODIUM PHOSPHATE AND BETAMETHASONE ACETATE 3; 3 MG/ML; MG/ML
6 INJECTION, SUSPENSION INTRA-ARTICULAR; INTRALESIONAL; INTRAMUSCULAR; SOFT TISSUE ONCE
Status: COMPLETED | OUTPATIENT
Start: 2018-06-14 | End: 2018-06-14

## 2018-06-14 RX ORDER — AMOXICILLIN AND CLAVULANATE POTASSIUM 875; 125 MG/1; MG/1
TABLET, FILM COATED ORAL
Qty: 20 TABLET | Refills: 0 | Status: SHIPPED | OUTPATIENT
Start: 2018-06-14

## 2018-06-14 RX ADMIN — BETAMETHASONE SODIUM PHOSPHATE AND BETAMETHASONE ACETATE 6 MG: 3; 3 INJECTION, SUSPENSION INTRA-ARTICULAR; INTRALESIONAL; INTRAMUSCULAR; SOFT TISSUE at 11:05

## 2018-06-14 NOTE — PROGRESS NOTES
"This gentleman has long-standing severe allergic rhinitis and episodes of bronchitis.  He is recently started immunotherapy.  He complains of a several day history of sneezing, cough, purulent sputum and purulent nasal drainage.  He denies chills fever chest pain or shortness of breath    ROS    Constitutional-no night sweats weight loss headaches  GI no abdominal pain nausea or diarrhea  Neuro no seizure or neurologic deficits  Musculoskeletal no deformity or joint pain   no dysuria or hematuria  Skin no rash or other lesions  All other systems reviewed and were negative except for the above.      Physical Exam  /86 (BP Location: Left arm, Patient Position: Sitting, Cuff Size: Adult)   Pulse 103   Ht 185.4 cm (73\")   Wt 110 kg (242 lb 6.4 oz)   SpO2 98%   BMI 31.98 kg/m²   Vital signs as above  Pupils equally round and reactive to light and accommodation, neck no JVD or adenopathy.  Cardiovascular regular rhythm and rate no murmur or gallop.  Abdomen soft no organomegaly tenderness.  Extremities no clubbing cyanosis or edema.  No cervical adenopathy.  No skin rash.  Neurologic good strength bilaterally without deficits  Alert mild cough with rhonchi, nose is congested with purulent exudate and swollen inferior turbinates    Impression allergic rhinitis exacerbation, purulent rhinitis, acute bronchitis.    Plan Celestone 2 cc IM, Augmentin, continue present medications, continue immunotherapy, return in a few months or sooner if not improved          This document has been electronically signed by Trenton Romero MD on June 14, 2018 10:59 AM      "

## 2018-06-20 ENCOUNTER — CLINICAL SUPPORT (OUTPATIENT)
Dept: PULMONOLOGY | Facility: CLINIC | Age: 48
End: 2018-06-20

## 2018-06-20 DIAGNOSIS — J30.2 SEASONAL ALLERGIC RHINITIS, UNSPECIFIED CHRONICITY, UNSPECIFIED TRIGGER: Primary | ICD-10-CM

## 2018-06-20 PROCEDURE — 95115 IMMUNOTHERAPY ONE INJECTION: CPT | Performed by: INTERNAL MEDICINE

## 2018-07-03 ENCOUNTER — CLINICAL SUPPORT (OUTPATIENT)
Dept: PULMONOLOGY | Facility: CLINIC | Age: 48
End: 2018-07-03

## 2018-07-03 DIAGNOSIS — J30.2 SEASONAL ALLERGIC RHINITIS, UNSPECIFIED TRIGGER: ICD-10-CM

## 2018-07-03 DIAGNOSIS — J30.2 SEASONAL ALLERGIC RHINITIS, UNSPECIFIED CHRONICITY, UNSPECIFIED TRIGGER: Primary | ICD-10-CM

## 2018-07-03 PROCEDURE — 95115 IMMUNOTHERAPY ONE INJECTION: CPT | Performed by: INTERNAL MEDICINE

## 2018-07-10 ENCOUNTER — CLINICAL SUPPORT (OUTPATIENT)
Dept: PULMONOLOGY | Facility: CLINIC | Age: 48
End: 2018-07-10

## 2018-07-10 DIAGNOSIS — J30.2 SEASONAL ALLERGIC RHINITIS, UNSPECIFIED CHRONICITY, UNSPECIFIED TRIGGER: Primary | ICD-10-CM

## 2018-07-10 PROCEDURE — 95115 IMMUNOTHERAPY ONE INJECTION: CPT | Performed by: INTERNAL MEDICINE

## 2018-07-14 ENCOUNTER — HOSPITAL ENCOUNTER (EMERGENCY)
Facility: HOSPITAL | Age: 48
Discharge: HOME | End: 2018-07-14
Admitting: NURSE PRACTITIONER

## 2018-07-14 VITALS
OXYGEN SATURATION: 98 % | SYSTOLIC BLOOD PRESSURE: 155 MMHG | DIASTOLIC BLOOD PRESSURE: 93 MMHG | WEIGHT: 240 LBS | RESPIRATION RATE: 20 BRPM | BODY MASS INDEX: 31.81 KG/M2 | HEART RATE: 78 BPM | TEMPERATURE: 98 F | HEIGHT: 73 IN

## 2018-07-14 DIAGNOSIS — M54.9 OTHER ACUTE BACK PAIN: ICD-10-CM

## 2018-07-14 DIAGNOSIS — M54.12 CERVICAL RADICULOPATHY: Primary | ICD-10-CM

## 2018-07-14 PROCEDURE — 25010000002 PROMETHAZINE PER 50 MG: Performed by: NURSE PRACTITIONER

## 2018-07-14 PROCEDURE — 99283 EMERGENCY DEPT VISIT LOW MDM: CPT

## 2018-07-14 PROCEDURE — 25010000002 HYDROMORPHONE PER 4 MG: Performed by: NURSE PRACTITIONER

## 2018-07-14 PROCEDURE — 96372 THER/PROPH/DIAG INJ SC/IM: CPT

## 2018-07-14 RX ORDER — HYDROMORPHONE HCL 110MG/55ML
1 PATIENT CONTROLLED ANALGESIA SYRINGE INTRAVENOUS ONCE
Status: COMPLETED | OUTPATIENT
Start: 2018-07-14 | End: 2018-07-14

## 2018-07-14 RX ORDER — CETIRIZINE HYDROCHLORIDE 10 MG/1
10 TABLET ORAL DAILY
COMMUNITY

## 2018-07-14 RX ORDER — OXYMETAZOLINE HYDROCHLORIDE 0.05 G/100ML
2 SPRAY NASAL 2 TIMES DAILY
COMMUNITY

## 2018-07-14 RX ORDER — HYDROCODONE BITARTRATE AND ACETAMINOPHEN 10; 325 MG/1; MG/1
1 TABLET ORAL EVERY 6 HOURS PRN
Qty: 15 TABLET | Refills: 0 | Status: SHIPPED | OUTPATIENT
Start: 2018-07-14 | End: 2018-07-17

## 2018-07-14 RX ORDER — CAPSAICIN 0.75 MG/G
CREAM TOPICAL 3 TIMES DAILY
COMMUNITY

## 2018-07-14 RX ORDER — PROMETHAZINE HYDROCHLORIDE 25 MG/ML
25 INJECTION, SOLUTION INTRAMUSCULAR; INTRAVENOUS ONCE
Status: COMPLETED | OUTPATIENT
Start: 2018-07-14 | End: 2018-07-14

## 2018-07-14 RX ADMIN — HYDROMORPHONE HYDROCHLORIDE 1 MG: 2 INJECTION INTRAMUSCULAR; INTRAVENOUS; SUBCUTANEOUS at 20:27

## 2018-07-14 RX ADMIN — PROMETHAZINE HYDROCHLORIDE 25 MG: 25 INJECTION INTRAMUSCULAR; INTRAVENOUS at 20:27

## 2018-07-15 NOTE — ED PROVIDER NOTES
Subjective   47-year-old male reports emergency department complaining of back pain neck pain and shoulder pain.  The patient reports she has been seen by his primary care, chiropractor, an urgent care.  Has received some x-ray films that show an abnormality of the cervical spine.  The patient has his own portable TENS unit in place for now.  Denies any type of paresthesias in his extremities.  No trouble with bowel or bladder incontinence.  Patient reports he has been unable to sleep at night for the past 2 nights.        History provided by:  Patient   used: No        Review of Systems   Constitutional: Positive for appetite change and fatigue. Negative for chills and fever.   HENT: Negative for congestion.    Eyes: Negative for photophobia.   Respiratory: Negative for shortness of breath and wheezing.    Cardiovascular: Negative for chest pain and palpitations.   Gastrointestinal: Negative for diarrhea, nausea and vomiting.   Musculoskeletal: Positive for arthralgias, back pain, gait problem, neck pain and neck stiffness.   Skin: Negative for rash.   Allergic/Immunologic: Negative for immunocompromised state.   Neurological: Positive for weakness.   Hematological: Negative for adenopathy.   Psychiatric/Behavioral: Negative for confusion.       Past Medical History:   Diagnosis Date   • Acute sinusitis    • Allergic rhinitis     due to pollen   • Astigmatism    • Closed fracture of phalanx of foot    • Contusion of lower leg     left; RICE and NSAIDS   • Elbow joint pain    • Foot pain    • Fracture of bone    • IgE deficiency (CMS/HCC)     mediated allergic asthma   • Metatarsalgia    • Myopia    • Noncompliance with treatment        Allergies   Allergen Reactions   • Morphine Hives   • Morphine And Related    • Percocet [Oxycodone-Acetaminophen] Itching       Past Surgical History:   Procedure Laterality Date   • ADENOIDECTOMY     • EXTRACORPOREAL SHOCKWAVE LITHOTRIPSY (ESWL), STENT  INSERTION/REMOVAL Right 2/17/2017    Procedure: RIGHT EXTRACORPOREAL SHOCKWAVE LITHOTRIPSY;  Surgeon: Brayan Peralta MD;  Location: [facility] OR;  Service:    • INJECTION OF MEDICATION      depo medrol   • INJECTION OF MEDICATION      kenalog   • KNEE SURGERY Bilateral    • OTHER SURGICAL HISTORY      T&A (1)      • TONSILLECTOMY         History reviewed. No pertinent family history.    Social History     Social History   • Marital status:      Social History Main Topics   • Smoking status: Never Smoker   • Smokeless tobacco: Never Used   • Alcohol use No   • Drug use: No   • Sexual activity: Defer     Other Topics Concern   • Not on file           Objective   Physical Exam   Constitutional: He is oriented to person, place, and time. Vital signs are normal. He appears well-developed and well-nourished.   HENT:   Head: Normocephalic.   Nose: Nose normal.   Eyes: Conjunctivae are normal. Pupils are equal, round, and reactive to light.   Neck: Normal range of motion.   Cardiovascular: Normal rate, regular rhythm and normal heart sounds.    Pulmonary/Chest: Effort normal and breath sounds normal.   Abdominal: Soft.   Musculoskeletal: He exhibits tenderness.        Cervical back: He exhibits tenderness.        Back:    Neurological: He is alert and oriented to person, place, and time. GCS eye subscore is 4. GCS verbal subscore is 5. GCS motor subscore is 6.   Skin: Skin is warm and dry.   Psychiatric: He has a normal mood and affect.   Nursing note and vitals reviewed.      Procedures           ED Course  ED Course as of Jul 14 2026   Sat Jul 14, 2018 2012 [id number] AGUSTIN swain.   [JL]      ED Course User Index  [JL] OLENA Cummings                  [facility]  Number of Diagnoses or Management Options  Cervical radiculopathy:   Other acute back pain:   Risk of Complications, Morbidity, and/or Mortality  General comments: Patient instructed to follow up with primary care for further imaging.  Likely will need at some  type of MRI or further imaging and evaluation.    Patient Progress  Patient progress: stable        Final diagnoses:   Cervical radiculopathy   Other acute back pain            OLENA Cummings  07/14/18 2026

## 2018-07-16 ENCOUNTER — CLINICAL SUPPORT (OUTPATIENT)
Dept: PULMONOLOGY | Facility: CLINIC | Age: 48
End: 2018-07-16

## 2018-07-16 DIAGNOSIS — J30.2 SEASONAL ALLERGIC RHINITIS, UNSPECIFIED CHRONICITY, UNSPECIFIED TRIGGER: Primary | ICD-10-CM

## 2018-07-16 PROCEDURE — 95115 IMMUNOTHERAPY ONE INJECTION: CPT | Performed by: INTERNAL MEDICINE

## 2018-07-23 ENCOUNTER — TRANSCRIBE ORDERS (OUTPATIENT)
Dept: PHYSICAL THERAPY | Facility: HOSPITAL | Age: 48
End: 2018-07-23

## 2018-07-23 DIAGNOSIS — M79.602 LEFT ARM PAIN: Primary | ICD-10-CM

## 2018-07-23 DIAGNOSIS — M79.2 PAROXYSMAL NERVE PAIN: ICD-10-CM

## 2018-07-24 ENCOUNTER — CLINICAL SUPPORT (OUTPATIENT)
Dept: PULMONOLOGY | Facility: CLINIC | Age: 48
End: 2018-07-24

## 2018-07-24 DIAGNOSIS — J30.2 SEASONAL ALLERGIC RHINITIS, UNSPECIFIED CHRONICITY, UNSPECIFIED TRIGGER: Primary | ICD-10-CM

## 2018-07-24 PROCEDURE — 95115 IMMUNOTHERAPY ONE INJECTION: CPT | Performed by: INTERNAL MEDICINE

## 2018-07-30 ENCOUNTER — CLINICAL SUPPORT (OUTPATIENT)
Dept: PULMONOLOGY | Facility: CLINIC | Age: 48
End: 2018-07-30

## 2018-07-30 ENCOUNTER — HOSPITAL ENCOUNTER (OUTPATIENT)
Dept: PHYSICAL THERAPY | Facility: HOSPITAL | Age: 48
Setting detail: THERAPIES SERIES
Discharge: HOME | End: 2018-07-30

## 2018-07-30 DIAGNOSIS — M79.2 PAROXYSMAL NERVE PAIN: ICD-10-CM

## 2018-07-30 DIAGNOSIS — M79.602 LEFT ARM PAIN: ICD-10-CM

## 2018-07-30 DIAGNOSIS — J30.2 SEASONAL ALLERGIC RHINITIS, UNSPECIFIED CHRONICITY, UNSPECIFIED TRIGGER: Primary | ICD-10-CM

## 2018-07-30 DIAGNOSIS — M54.12 CERVICAL RADICULOPATHY: Primary | ICD-10-CM

## 2018-07-30 PROCEDURE — G0283 ELEC STIM OTHER THAN WOUND: HCPCS | Performed by: PHYSICAL THERAPIST

## 2018-07-30 PROCEDURE — G8984 CARRY CURRENT STATUS: HCPCS | Performed by: PHYSICAL THERAPIST

## 2018-07-30 PROCEDURE — 97161 PT EVAL LOW COMPLEX 20 MIN: CPT | Performed by: PHYSICAL THERAPIST

## 2018-07-30 PROCEDURE — 95115 IMMUNOTHERAPY ONE INJECTION: CPT | Performed by: INTERNAL MEDICINE

## 2018-07-30 PROCEDURE — 97140 MANUAL THERAPY 1/> REGIONS: CPT | Performed by: PHYSICAL THERAPIST

## 2018-07-30 PROCEDURE — G8985 CARRY GOAL STATUS: HCPCS | Performed by: PHYSICAL THERAPIST

## 2018-07-30 NOTE — THERAPY EVALUATION
Outpatient Physical Therapy Ortho Initial Evaluation  [facility]     Patient Name: Darvin Hoang  : 1970  MRN: [id number]  Today's Date: 2018      Visit Date: 2018    Patient Active Problem List   Diagnosis   • Allergic rhinitis due to pollen        Past Medical History:   Diagnosis Date   • Acute sinusitis    • Allergic rhinitis     due to pollen   • Astigmatism    • Closed fracture of phalanx of foot    • Contusion of lower leg     left; RICE and NSAIDS   • Elbow joint pain    • Foot pain    • Fracture of bone    • IgE deficiency (CMS/HCC)     mediated allergic asthma   • Metatarsalgia    • Myopia    • Noncompliance with treatment         Past Surgical History:   Procedure Laterality Date   • ADENOIDECTOMY     • EXTRACORPOREAL SHOCKWAVE LITHOTRIPSY (ESWL), STENT INSERTION/REMOVAL Right 2017    Procedure: RIGHT EXTRACORPOREAL SHOCKWAVE LITHOTRIPSY;  Surgeon: Brayan Peralta MD;  Location: [facility];  Service:    • INJECTION OF MEDICATION      depo medrol   • INJECTION OF MEDICATION      kenalog   • KNEE SURGERY Bilateral    • OTHER SURGICAL HISTORY      T&A (1)      • TONSILLECTOMY     Medications (Admitted on 2018)    Percocet   Flexoril   capsicum (ZOSTRIX) 0.075 % topical cream    cetirizine (zyrTEC) 10 MG tablet    cholecalciferol (VITAMIN D3) 60943 units capsule    diphenhydrAMINE (BENADRYL) 25 mg capsule    fluticasone (VERAMYST) 27.5 MCG/SPRAY nasal spray    ipratropium (ATROVENT) 0.06 % nasal spray    LORATADINE PO    METHOCARBAMOL PO    oxymetazoline (AFRIN) 0.05 % nasal spray   ALLERGIES: Morphine and related, Percocet -itching    Visit Dx:     ICD-10-CM ICD-9-CM   1. Cervical radiculopathy M54.12 723.4   2. Paroxysmal nerve pain M79.2 729.2   3. Left arm pain M79.602 729.5             Patient History     Row Name 18 0900             History    Chief Complaint Pain;Muscle weakness;Tinglings  -DD      Type of Pain Neck pain;Upper Extremity / Arm   L  -DD       Date Current Problem(s) Began --   3 weeks  -DD      Brief Description of Current Complaint Onset of pain radiating down left UE, has had x-rays and MRI MD wandted to do surgery.  Pt wanted PT first.  MD is out of town for 2 weeks now.  -DD      Hand Dominance right-handed  -DD      Occupation/sports/leisure activities Disabled VET  -DD      Results of Clinical Tests DDD and nerve root compression C3-4 and C5-6  -DD         Pain     Pain Location Neck;Arm  -DD      Pain at Present 10  -DD      Pain at Worst 10  -DD      Pain Frequency Constant/continuous  -DD      Is your sleep disturbed? Yes  -DD      Is medication used to assist with sleep? Yes  -DD         Fall Risk Assessment    Any falls in the past year: No  -DD        User Key  (r) = Recorded By, (t) = Taken By, (c) = Cosigned By    Initials Name Provider Type    BOSSMAN King, LAXMI Physical Therapist        OBJECTIVE:   AROM- cervical flexion 15°, extension 5°, right rotation 45°, left 30°, cervical lateral flexion right 20°, left 10°.   strength position 2 on the hand-held dynamometer right 110 pounds, left 20 pounds.  Gross motor strength shoulder 4 out of 5 on the left 5 out of 5 on the right.  Wrist extension 4 minus and flexion 4 on the left right 5.        PT Ortho     Row Name 07/30/18 1000       Posture/Observations    Posture/Observations Comments rounded shoulder posture, guarded trunk movement with Left arm splinted to side and hand elvated across the chest  -DD      User Key  (r) = Recorded By, (t) = Taken By, (c) = Cosigned By    Initials Name Provider Type    BOSSMAN King, LAXMI Physical Therapist      Unable to perform Spurling's test                            PT OP Goals     Row Name 07/30/18 0900          PT Short Term Goals    STG Date to Achieve 08/20/18  -DD     STG 1 Pt will be independent in HEP  -DD     STG 2 Patient will have an NDI score of 60% or less  -DD     STG 3 Patient will have a quick Dash score of  75% or less  -DD     STG 4 Patient will have cervical flexion 30°  -DD     STG 5 Patient will have cervical extension 15°  -DD     STG 6 Patient will have cervical lateral flexion left to 20°  -DD     STG 7 Patient will Have left cervical rotation to 45°  -DD        Long Term Goals    LTG Date to Achieve 08/27/18  -DD     LTG 1 Patient will report pain at rest 7/10 on numerical analogue scale  -DD     LTG 2 Patient will have increased  strength to 30 pounds on the left  -DD     LTG 3 Patient will have cervical range of motion within functional limits  -DD     LTG 4 Patient will manual muscle test shoulder throughout upper extremity 5/5  -DD        Time Calculation    PT Goal Re-Cert Due Date 08/20/18  -DD       User Key  (r) = Recorded By, (t) = Taken By, (c) = Cosigned By    Initials Name Provider Type    DD Dottie King, PT Physical Therapist                PT Assessment/Plan     Row Name 07/30/18 0928          PT Assessment    Functional Limitations Performance in leisure activities;Performance in self-care ADL;Limitation in home management  -DD     Impairments Range of motion;Posture;Poor body mechanics;Pain;Muscle strength  -DD     Assessment Comments Pt presents in extreme pain with motor defecits present for shoulder, wrist and  strength.  diagnositc imaages positive for nerve compression, pt is hoping to avoid surgical intervention. We will proceed with conservative treatment to relieve pain.  -DD     Please refer to paper survey for additional self-reported information Yes  -DD     Rehab Potential Fair  -DD     Patient/caregiver participated in establishment of treatment plan and goals Yes  -DD     Patient would benefit from skilled therapy intervention Yes  -DD        PT Plan    PT Frequency 3x/week  -DD     Predicted Duration of Therapy Intervention (Therapy Eval) 4 weeks  -DD     Planned CPT's? PT EVAL MOD COMPLELITY: 47442;PT MANUAL THERAPY EA 15 MIN: 85160;PT HOT OR COLD PACK TREAT  MCARE;PT ELECTRICAL STIM UNATTEND: ;PT THER PROC EA 15 MIN: 08055  -DD     Physical Therapy Interventions (Optional Details) home exercise program;manual therapy techniques;modalities;strengthening;ROM (Range of Motion);stretching  -DD     PT Plan Comments Manual therapy distraction. PA to thoracics and scapular glides, and stretching, neural tension stretch as able.  Modalities for pain.  Traction not an approved CPT code on [facility].  -DD       User Key  (r) = Recorded By, (t) = Taken By, (c) = Cosigned By    Initials Name Provider Type    BOSSMAN King, LAXMI Physical Therapist                Modalities     Row Name 07/30/18 0900             Ice    Ice Applied Yes  -DD      Location neck and left scapular area  -DD      Rx Minutes --   20  -DD      Ice S/P Rx Yes  -DD         ELECTRICAL STIMULATION    Attended/Unattended Unattended  -DD      Stimulation Type IFC  -DD      Max mAmp 10  -DD      Location/Electrode Placement/Other left UT and medial scapular border.  -DD        User Key  (r) = Recorded By, (t) = Taken By, (c) = Cosigned By    Initials Name Provider Type    BOSSMAN King, PT Physical Therapist             Manual Rx (last 36 hours)      Manual Treatments     Row Name 07/30/18 0900             Manual Rx 1    Manual Rx 1 Location distraction  -DD      Manual Rx 1 Duration 5  -DD         Manual Rx 2    Manual Rx 2 Location cervical  -DD      Manual Rx 2 Type ME/glides  -DD      Manual Rx 2 Duration 5  -DD         Manual Rx 3    Manual Rx 3 Location PA thoracics/ MFR  -DD      Manual Rx 3 Duration 3'  -DD         Manual Rx 4    Manual Rx 4 Location scapular glides  -DD      Manual Rx 4 Duration 3  -DD        User Key  (r) = Recorded By, (t) = Taken By, (c) = Cosigned By    Initials Name Provider Type    BOSSMAN King, LAXMI Physical Therapist                      Outcome Measure Options: Neck Disability Index (NDI), Quick DASH  Quick DASH  Open a tight or new jar.:  Unable  Do heavy household chores (e.g., wash walls, wash floors): Unable  Carry a shopping bag or briefcase: Unable  Wash your back: Unable  Use a knife to cut food: Unable  Recreational activities in which you take some force or impact through your arm, should or hand (e.g. golf, hammering, tennis, etc.): Unable  During the past week, to what extent has your arm, shoulder, or hand problem interfered with your normal social activites with family, friends, neighbors or groups?: Extremely  During the past week, were you limited in your work or other regular daily activities as a result of your arm, shoulder or hand problem?: Unable  Arm, Shoulder, or hand pain: Extreme  Tingling (pins and needles) in your arm, shoulder, or hand: Extreme  During the past week, how much difficulty have you had sleeping because of the pain in your arm, shoulder or hand?: So much Difficulty that I can't sleep  Number of Questions Answered: 11  Quick DASH Score: 100  Neck Disability Index  Section 1 - Pain Intensity: The pain is very severe at the moment.  Section 2 - Personal Care: I need help every day in most aspects of self-care.  Section 3 - Lifting: I cannot lift or carry anything at all.  Section 4 - Work: I can't do any work at all.  Section 5 - Headaches: I have no headaches at all.  Section 6 - Concentration: I have a great deal of difficulty concentrating  Section 7 - Sleeping: My sleep is mildly disturbed for up to 1-2 hours.  Section 8 - Driving: I can't drive my car at all because of neck pain.  Section 9 - Reading: I can read as much as I want with slight neck pain.  Section 10 - Recreation: I can't do any recreational activities due to neck pain.  Neck Disability Index Score: 35  Neck Disability Index Comments: 70%      Time Calculation:     Therapy Suggested Charges     Code   Minutes Charges    None             Start Time: 0845  Stop Time: 0936  Time Calculation (min): 51 min  Total Timed Code Minutes- PT: 15 minute(s)      Therapy Charges for Today     Code Description Service Date Service Provider Modifiers Qty    11107535243 HC PT CARRY MOV HAND OBJ CURRENT 7/30/2018 Dottie King, PT GP, CL 1    33721642215 HC PT CARRY MOV HAND OBJ PROJECTED 7/30/2018 Dottie King, PT GP, CJ 1    76928923782 HC PT EVAL LOW COMPLEXITY 1 7/30/2018 Dottie King, PT GP 1    19672861521 HC PT ELECTRICAL STIM UNATTENDED 7/30/2018 Dottie King, PT  1    12563633677 HC PT MANUAL THERAPY EA 15 MIN 7/30/2018 Dottie King, PT GP 1          PT G-Codes  PT Professional Judgement Used?: Yes  Outcome Measure Options: Neck Disability Index (NDI), Quick DASH  Score: 100% quik dash, 70 NDI  Functional Limitation: Carrying, moving and handling objects  Carrying, Moving and Handling Objects Current Status (): At least 60 percent but less than 80 percent impaired, limited or restricted  Carrying, Moving and Handling Objects Goal Status (): At least 20 percent but less than 40 percent impaired, limited or restricted         Dottie King, PT, ATC, DPT  7/30/2018

## 2018-08-01 ENCOUNTER — HOSPITAL ENCOUNTER (OUTPATIENT)
Dept: PHYSICAL THERAPY | Facility: HOSPITAL | Age: 48
Setting detail: THERAPIES SERIES
Discharge: HOME | End: 2018-08-01

## 2018-08-01 DIAGNOSIS — M79.602 LEFT ARM PAIN: ICD-10-CM

## 2018-08-01 DIAGNOSIS — M79.2 PAROXYSMAL NERVE PAIN: Primary | ICD-10-CM

## 2018-08-01 DIAGNOSIS — M54.12 CERVICAL RADICULOPATHY: ICD-10-CM

## 2018-08-01 PROCEDURE — G0283 ELEC STIM OTHER THAN WOUND: HCPCS

## 2018-08-01 PROCEDURE — 97110 THERAPEUTIC EXERCISES: CPT

## 2018-08-01 NOTE — THERAPY TREATMENT NOTE
Outpatient Physical Therapy Ortho Treatment Note  [facility]     Patient Name: Darvin Hoang  : 1970  MRN: [id number]  Today's Date: 2018      Visit Date: 2018     Sub imp 05  Visit 2/2 (12)  MD DANNY Givens 18    Visit Dx:    ICD-10-CM ICD-9-CM   1. Paroxysmal nerve pain M79.2 729.2   2. Left arm pain M79.602 729.5   3. Cervical radiculopathy M54.12 723.4       Patient Active Problem List   Diagnosis   • Allergic rhinitis due to pollen        Past Medical History:   Diagnosis Date   • Acute sinusitis    • Allergic rhinitis     due to pollen   • Astigmatism    • Closed fracture of phalanx of foot    • Contusion of lower leg     left; RICE and NSAIDS   • Elbow joint pain    • Foot pain    • Fracture of bone    • IgE deficiency (CMS/HCC)     mediated allergic asthma   • Metatarsalgia    • Myopia    • Noncompliance with treatment         Past Surgical History:   Procedure Laterality Date   • ADENOIDECTOMY     • EXTRACORPOREAL SHOCKWAVE LITHOTRIPSY (ESWL), STENT INSERTION/REMOVAL Right 2017    Procedure: RIGHT EXTRACORPOREAL SHOCKWAVE LITHOTRIPSY;  Surgeon: Brayan Peralta MD;  Location: [facility];  Service:    • INJECTION OF MEDICATION      depo medrol   • INJECTION OF MEDICATION      kenalog   • KNEE SURGERY Bilateral    • OTHER SURGICAL HISTORY      T&A (1)      • TONSILLECTOMY               PT Ortho     Row Name 18 1000       Posture/Observations    Posture/Observations Comments rounded shoulder posture, guarded trunk movement with Left arm splinted to side and hand elvated across the chest  -DD      User Key  (r) = Recorded By, (t) = Taken By, (c) = Cosigned By    Initials Name Provider Type    BOSSMAN King, PT Physical Therapist                            PT Assessment/Plan     Row Name 18 0955          PT Assessment    Assessment Comments (P)  tolerated cervical traction manual without c/o increased pain at present  -SABA        PT Plan    PT Frequency  (P)  3x/week  -SABA     Predicted Duration of Therapy Intervention (Therapy Eval) (P)  4 weeks  -SABA     PT Plan Comments (P)  Continue with manual distraction, cervical glides. IFC/ICE  -SABA       User Key  (r) = Recorded By, (t) = Taken By, (c) = Cosigned By    Initials Name Provider Type    SABA Mejia [facility]                 Modalities     Row Name 08/01/18 0900             Ice    Location (P)  neck and left scapular area  -SABA      Rx Minutes (P)  --   20  -SABA      Ice S/P Rx (P)  Yes  -SABA         ELECTRICAL STIMULATION    Attended/Unattended (P)  Unattended  -SABA      Stimulation Type (P)  IFC  -SABA      Max mAmp (P)  10  -SABA      Location/Electrode Placement/Other (P)  left UT and medial scapular border.  -SABA        User Key  (r) = Recorded By, (t) = Taken By, (c) = Cosigned By    Initials Name Provider Type    SABA Mejia [facility]                 Exercises     Row Name 08/01/18 0900             Subjective Comments    Subjective Comments (P)  reports took  pain meds about 1.5 hours ago. Increased pain after last visit, although distraction and Estim and ice help some  -SABA         Subjective Pain    Able to rate subjective pain? (P)  yes  -SABA      Pre-Treatment Pain Level (P)  7  -SABA      Post-Treatment Pain Level (P)  7  -SABA         Exercise 1    Exercise Name 1 (P)  see manual  -SABA        User Key  (r) = Recorded By, (t) = Taken By, (c) = Cosigned By    Initials Name Provider Type    SABA Mejia [facility]                         Manual Rx (last 36 hours)      Manual Treatments     Row Name 08/01/18 0900             Manual Rx 1    Manual Rx 1 Location (P)  Distraction  -SABA      Manual Rx 1 Duration (P)  15  -SABA         Manual Rx 2    Manual Rx 2 Location (P)  Cervical L MFR  -SABA      Manual Rx 2 Duration (P)  3  -SABA        User Key  (r) = Recorded By, (t) = Taken By, (c) = Cosigned By    Initials Name Provider Type    SABA Mejia [facility]                  PT OP Goals     Row Name 08/01/18 0900          PT Short Term Goals    STG Date to Achieve (P)  08/20/18  -     STG 1 (P)  Pt will be independent in HEP  -SABA     STG 1 Progress (P)  Not Met  -SABA     STG 2 (P)  Patient will have an NDI score of 60% or less  -SABA     STG 2 Progress (P)  Not Met  -SABA     STG 3 (P)  Patient will have a quick Dash score of 75% or less  -SABA     STG 3 Progress (P)  Not Met  -SABA     STG 4 (P)  Patient will have cervical flexion 30°  -SABA     STG 4 Progress (P)  Not Met  -SABA     STG 5 (P)  Patient will have cervical extension 15°  -SABA     STG 5 Progress (P)  Not Met  -SABA     STG 6 (P)  Patient will have cervical lateral flexion left to 20°  -SABA     STG 7 (P)  Patient will Have left cervical rotation to 45°  -        Long Term Goals    LTG Date to Achieve (P)  08/27/18  -     LTG 1 (P)  Patient will report pain at rest 7/10 on numerical analogue scale  -     LTG 2 (P)  Patient will have increased  strength to 30 pounds on the left  -     LTG 3 (P)  Patient will have cervical range of motion within functional limits  -     LTG 4 (P)  Patient will manual muscle test shoulder throughout upper extremity 5/5  -       User Key  (r) = Recorded By, (t) = Taken By, (c) = Cosigned By    Initials Name Provider Type     Tay Mejia, ATC           Therapy Education  Given: (P) HEP  Program: (P) Reinforced  How Provided: (P) Verbal  Provided to: (P) Patient  Level of Understanding: (P) Verbalized              Time Calculation:   Start Time: (P) 0926  Stop Time: (P) 1020  Time Calculation (min): (P) 54 min  Total Timed Code Minutes- PT: (P) 34 minute(s)  Therapy Suggested Charges     Code   Minutes Charges    None           Therapy Charges for Today     Code Description Service Date Service Provider Modifiers Qty    84207163786 HC PT THER PROC EA 15 MIN 8/1/2018 Tay Mejia, ATC  2    55222744690 HC PT ELECTRICAL STIM UNATTENDED 8/1/2018 Tay Mejia, ATC  1     [id number]  PT THER SUPP EA 15 MIN 8/1/2018 Tay Mejia, ATC  1                    Tay Mejia ATC  8/1/2018

## 2018-08-03 ENCOUNTER — HOSPITAL ENCOUNTER (OUTPATIENT)
Dept: PHYSICAL THERAPY | Facility: HOSPITAL | Age: 48
Setting detail: THERAPIES SERIES
Discharge: HOME | End: 2018-08-03

## 2018-08-03 DIAGNOSIS — M54.12 CERVICAL RADICULOPATHY: ICD-10-CM

## 2018-08-03 DIAGNOSIS — M79.602 LEFT ARM PAIN: ICD-10-CM

## 2018-08-03 DIAGNOSIS — M79.2 PAROXYSMAL NERVE PAIN: Primary | ICD-10-CM

## 2018-08-03 PROCEDURE — G0283 ELEC STIM OTHER THAN WOUND: HCPCS

## 2018-08-03 PROCEDURE — 97110 THERAPEUTIC EXERCISES: CPT

## 2018-08-03 NOTE — THERAPY TREATMENT NOTE
Outpatient Physical Therapy Ortho Treatment Note  [facility]     Patient Name: Darvin Hoang  : 1970  MRN: [id number]  Today's Date: 8/3/2018      Visit Date: 2018     Sub imp 0%  Visit 3/3 (12)  MD hurd  RE 18    Visit Dx:    ICD-10-CM ICD-9-CM   1. Paroxysmal nerve pain M79.2 729.2   2. Left arm pain M79.602 729.5   3. Cervical radiculopathy M54.12 723.4       Patient Active Problem List   Diagnosis   • Allergic rhinitis due to pollen        Past Medical History:   Diagnosis Date   • Acute sinusitis    • Allergic rhinitis     due to pollen   • Astigmatism    • Closed fracture of phalanx of foot    • Contusion of lower leg     left; RICE and NSAIDS   • Elbow joint pain    • Foot pain    • Fracture of bone    • IgE deficiency (CMS/HCC)     mediated allergic asthma   • Metatarsalgia    • Myopia    • Noncompliance with treatment         Past Surgical History:   Procedure Laterality Date   • ADENOIDECTOMY     • EXTRACORPOREAL SHOCKWAVE LITHOTRIPSY (ESWL), STENT INSERTION/REMOVAL Right 2017    Procedure: RIGHT EXTRACORPOREAL SHOCKWAVE LITHOTRIPSY;  Surgeon: Brayan Peralta MD;  Location: [facility];  Service:    • INJECTION OF MEDICATION      depo medrol   • INJECTION OF MEDICATION      kenalog   • KNEE SURGERY Bilateral    • OTHER SURGICAL HISTORY      T&A (1)      • TONSILLECTOMY                               PT Assessment/Plan     Row Name 18 1114          PT Assessment    Assessment Comments (P)  Improved pain post rx.   -SABA        PT Plan    PT Frequency (P)  3x/week  -SABA     Predicted Duration of Therapy Intervention (Therapy Eval) (P)  4 weeks  -SABA     PT Plan Comments (P)  Cont per POC with manaual. Modalties post.   -SABA       User Key  (r) = Recorded By, (t) = Taken By, (c) = Cosigned By    Initials Name Provider Type    Tay Adams, ATC                 Modalities     Row Name 18 1100             Ice    Ice Applied (P)  Yes  -SABA      Location  (P)  neck and left scapular area  -SABA      Rx Minutes (P)  --   20  -SABA      Ice S/P Rx (P)  Yes  -SABA         ELECTRICAL STIMULATION    Attended/Unattended (P)  Unattended  -      Stimulation Type (P)  IFC  -      Max mAmp (P)  10  -SABA      Location/Electrode Placement/Other (P)  left UT and medial scapular border.  -SABA        User Key  (r) = Recorded By, (t) = Taken By, (c) = Cosigned By    Initials Name Provider Type    Tay Adams, [facility]                 Exercises     Row Name 08/03/18 1000             Subjective Comments    Subjective Comments (P)  Reports feeling a little better with neck and L arm pain at present  -         Subjective Pain    Able to rate subjective pain? (P)  yes  -SABA      Pre-Treatment Pain Level (P)  7  -SABA      Post-Treatment Pain Level (P)  5  -SABA         Exercise 1    Exercise Name 1 (P)  See manual  -SABA        User Key  (r) = Recorded By, (t) = Taken By, (c) = Cosigned By    Initials Name Provider Type    Tay Adams, [facility]                         Manual Rx (last 36 hours)      Manual Treatments     Row Name 08/03/18 1000             Manual Rx 1    Manual Rx 1 Location (P)  Distraction  -SABA      Manual Rx 1 Duration (P)  15  -SABA         Manual Rx 2    Manual Rx 2 Location (P)  Cervical L MFR  -SABA      Manual Rx 2 Duration (P)  5  -SABA         Manual Rx 3    Manual Rx 3 Location (P)  PA thoracics/Rocking  -SABA      Manual Rx 3 Duration (P)  3  -SABA        User Key  (r) = Recorded By, (t) = Taken By, (c) = Cosigned By    Initials Name Provider Type    Tay Adams, [facility]                 PT OP Goals     Row Name 08/03/18 1000          PT Short Term Goals    STG Date to Achieve (P)  08/20/18  -SABA     STG 1 (P)  Pt will be independent in HEP  -SABA     STG 1 Progress (P)  Not Met  -SABA     STG 2 (P)  Patient will have an NDI score of 60% or less  -SABA     STG 2 Progress (P)  Not Met  -SABA     STG 3 (P)  Patient will have a quick Dash score  of 75% or less  -     STG 3 Progress (P)  Not Met  -     STG 4 (P)  Patient will have cervical flexion 30°  -     STG 4 Progress (P)  Not Met  -     STG 5 (P)  Patient will have cervical extension 15°  -     STG 5 Progress (P)  Not Met  -     STG 6 (P)  Patient will have cervical lateral flexion left to 20°  -     STG 7 (P)  Patient will Have left cervical rotation to 45°  -        Long Term Goals    LTG Date to Achieve (P)  08/27/18  -     LTG 1 (P)  Patient will report pain at rest 7/10 on numerical analogue scale  -     LTG 2 (P)  Patient will have increased  strength to 30 pounds on the left  -     LTG 3 (P)  Patient will have cervical range of motion within functional limits  -     LTG 4 (P)  Patient will manual muscle test shoulder throughout upper extremity 5/5  -       User Key  (r) = Recorded By, (t) = Taken By, (c) = Cosigned By    Initials Name Provider Type     Tay Mejia, ATC           Therapy Education  Given: (P) HEP  Program: (P) Reinforced  How Provided: (P) Verbal  Provided to: (P) Patient  Level of Understanding: (P) Verbalized              Time Calculation:   Start Time: (P) 1030  Stop Time: (P) 1115  Time Calculation (min): (P) 45 min  Total Timed Code Minutes- PT: (P) 25 minute(s)  Therapy Suggested Charges     Code   Minutes Charges    None           Therapy Charges for Today     Code Description Service Date Service Provider Modifiers Qty    96696399102 HC PT THER PROC EA 15 MIN 8/3/2018 Tay Mejia ATC  2    07248081004 HC PT ELECTRICAL STIM UNATTENDED 8/3/2018 Tay Mejia, ATC  1                    Tay Mejia ATC  8/3/2018

## 2018-08-06 ENCOUNTER — HOSPITAL ENCOUNTER (OUTPATIENT)
Dept: PHYSICAL THERAPY | Facility: HOSPITAL | Age: 48
Setting detail: THERAPIES SERIES
Discharge: HOME | End: 2018-08-06

## 2018-08-06 DIAGNOSIS — M79.2 PAROXYSMAL NERVE PAIN: Primary | ICD-10-CM

## 2018-08-06 DIAGNOSIS — M79.602 LEFT ARM PAIN: ICD-10-CM

## 2018-08-06 DIAGNOSIS — M54.12 CERVICAL RADICULOPATHY: ICD-10-CM

## 2018-08-06 PROCEDURE — 97140 MANUAL THERAPY 1/> REGIONS: CPT

## 2018-08-06 PROCEDURE — 97032 APPL MODALITY 1+ESTIM EA 15: CPT

## 2018-08-06 NOTE — THERAPY TREATMENT NOTE
Outpatient Physical Therapy Ortho Treatment Note  [facility]   [facility]ja [place]       Patient Name: Darvin Hoang  : 1970  MRN: [id number]  Today's Date: 2018      Visit Date: 2018   Pt reports 10/10 pain pre treatment, 8/10 pain post treatment  Reports 0% of improvement.  Attended 4/4 visits.  Insurance available: 12 visits   Next MD appt: TBD  Recertification: 2018.    Visit Dx:    ICD-10-CM ICD-9-CM   1. Paroxysmal nerve pain M79.2 729.2   2. Left arm pain M79.602 729.5   3. Cervical radiculopathy M54.12 723.4       Patient Active Problem List   Diagnosis   • Allergic rhinitis due to pollen        Past Medical History:   Diagnosis Date   • Acute sinusitis    • Allergic rhinitis     due to pollen   • Astigmatism    • Closed fracture of phalanx of foot    • Contusion of lower leg     left; RICE and NSAIDS   • Elbow joint pain    • Foot pain    • Fracture of bone    • IgE deficiency (CMS/HCC)     mediated allergic asthma   • Metatarsalgia    • Myopia    • Noncompliance with treatment         Past Surgical History:   Procedure Laterality Date   • ADENOIDECTOMY     • EXTRACORPOREAL SHOCKWAVE LITHOTRIPSY (ESWL), STENT INSERTION/REMOVAL Right 2017    Procedure: RIGHT EXTRACORPOREAL SHOCKWAVE LITHOTRIPSY;  Surgeon: Brayan Peralta MD;  Location: [facility];  Service:    • INJECTION OF MEDICATION      depo medrol   • INJECTION OF MEDICATION      kenalog   • KNEE SURGERY Bilateral    • OTHER SURGICAL HISTORY      T&A (1)      • TONSILLECTOMY                               PT Assessment/Plan     Row Name 18 1000          PT Assessment    Assessment Comments (P)  No PA to thoracics or scapular glides this visit due to patient reporting he typically has increased soreness after later in the day and he has no pain meds currently to help with pain.   -MC        PT Plan    PT Frequency (P)  3x/week  -     Predicted Duration of Therapy Intervention (Therapy Eval) (P)  4 weeks  -      PT Plan Comments (P)  Continue with POC for manual tx and modalities post  -MC       User Key  (r) = Recorded By, (t) = Taken By, (c) = Cosigned By    Initials Name Provider Type    Puja Munson                 Modalities     Row Name 08/06/18 1000             Ice    Ice Applied (P)  Yes  -MC      Location (P)  neck and left scapular area   -      Rx Minutes (P)  Other:   20 mins  -MC      Ice Prior to Rx (P)  No  -MC      Ice S/P Rx (P)  Yes  -MC         ELECTRICAL STIMULATION    Attended/Unattended (P)  Unattended  -      Stimulation Type (P)  IFC  -      Max mAmp (P)  20  -MC      Location/Electrode Placement/Other (P)  L upper trap and medial scap area  -      36895 - PT Electrical Stimulation (Manual) Minutes (P)  20  -MC        User Key  (r) = Recorded By, (t) = Taken By, (c) = Cosigned By    Initials Name Provider Type    Puja Munson                 Exercises     Row Name 08/06/18 1000             Subjective Comments    Subjective Comments (P)  Patient reports that he has been trying to get ahold of his doctor. He is currently not taking any pain meds and is waiting for the MD to respond. Hurting a lot today.  -MC         Subjective Pain    Able to rate subjective pain? (P)  yes  -MC      Pre-Treatment Pain Level (P)  10  -MC      Post-Treatment Pain Level (P)  8  -MC         Exercise 1    Exercise Name 1 (P)  See manual  -MC        User Key  (r) = Recorded By, (t) = Taken By, (c) = Cosigned By    Initials Name Provider Type     Puja Wayne                         Manual Rx (last 36 hours)      Manual Treatments     Row Name 08/06/18 0900             Manual Rx 1    Manual Rx 1 Location (P)  Cervical distraction  -MC      Manual Rx 1 Duration (P)  15  -MC         Manual Rx 2    Manual Rx 2 Location (P)  Cervical L MFR   -MC      Manual Rx 2 Duration (P)  7  -MC        User Key  (r) = Recorded By, (t) = Taken By, (c) = Cosigned  By    Initials Name Provider Type     Puja Wayne                 PT OP Goals     Row Name 08/06/18 1000          PT Short Term Goals    STG Date to Achieve (P)  08/20/18  -     STG 1 (P)  Pt will be independent in HEP  -     STG 1 Progress (P)  Not Met  -     STG 2 (P)  Patient will have an NDI score of 60% or less  -     STG 2 Progress (P)  Not Met  -     STG 3 (P)  Patient will have a quick Dash score of 75% or less  -     STG 3 Progress (P)  Not Met  -     STG 4 (P)  Patient will have cervical flexion 30°  -     STG 4 Progress (P)  Not Met  -     STG 5 (P)  Patient will have cervical extension 15°  -     STG 5 Progress (P)  Not Met  -     STG 6 (P)  Patient will have cervical lateral flexion left to 20°  -     STG 7 (P)  Patient will Have left cervical rotation to 45°  -        Long Term Goals    LTG Date to Achieve (P)  08/27/18  -     LTG 1 (P)  Patient will report pain at rest 7/10 on numerical analogue scale  -     LTG 2 (P)  Patient will have increased  strength to 30 pounds on the left  -     LTG 3 (P)  Patient will have cervical range of motion within functional limits  -     LTG 4 (P)  Patient will manual muscle test shoulder throughout upper extremity 5/5  -       User Key  (r) = Recorded By, (t) = Taken By, (c) = Cosigned By    Initials Name Provider Type    SABA Tay Mejia ATC      Puja Wayne           Therapy Education  Given: (P) HEP  How Provided: (P) Verbal  Level of Understanding: (P) Verbalized              Time Calculation:   Start Time: (P) 1013  Stop Time: (P) 1110  Time Calculation (min): (P) 57 min  Therapy Suggested Charges     Code   Minutes Charges    03449 (CPT®) Hc Pt Neuromusc Re Education Ea 15 Min      90768 (CPT®) Hc Pt Ther Proc Ea 15 Min      98009 (CPT®) Hc Gait Training Ea 15 Min      38560 (CPT®) Hc Pt Therapeutic Act Ea 15 Min      18881 (CPT®) Hc Pt Manual Therapy Ea 15  Min      92597 (CPT®) Hc Pt Ther Massage- Per 15 Min      30208 (CPT®) Hc Pt Iontophoresis Ea 15 Min      61561 (CPT®) Hc Pt Elec Stim Ea-Per 15 Min 20 1    38699 (CPT®) Hc Pt Ultrasound Ea 15 Min      78641 (CPT®) Hc Pt Self Care/Mgmt/Train Ea 15 Min      Total  20 1        Therapy Charges for Today     Code Description Service Date Service Provider Modifiers Qty    96004804687 HC PT ELEC STIM EA-PER 15 MIN 8/6/2018 Puja Wayne GP 1    39504975268 HC PT THER SUPP EA 15 MIN 8/6/2018 Puja Wayne GP 1    09875281254 HC PT MANUAL THERAPY EA 15 MIN 8/6/2018 Puja Wayne GP 2                    Puja Wayne  8/6/2018

## 2018-08-08 ENCOUNTER — HOSPITAL ENCOUNTER (OUTPATIENT)
Dept: PHYSICAL THERAPY | Facility: HOSPITAL | Age: 48
Setting detail: THERAPIES SERIES
Discharge: HOME | End: 2018-08-08

## 2018-08-08 DIAGNOSIS — M79.2 PAROXYSMAL NERVE PAIN: Primary | ICD-10-CM

## 2018-08-08 PROCEDURE — 97035 APP MDLTY 1+ULTRASOUND EA 15: CPT

## 2018-08-08 PROCEDURE — 97140 MANUAL THERAPY 1/> REGIONS: CPT

## 2018-08-08 PROCEDURE — G0283 ELEC STIM OTHER THAN WOUND: HCPCS

## 2018-08-08 NOTE — THERAPY TREATMENT NOTE
Outpatient Physical Therapy Ortho Treatment Note  [facility]     Patient Name: Darvin Hoang  : 1970  MRN: [id number]  Today's Date: 2018      Visit Date: 2018  Pt.  Has attended 5/5 visits  12 visits approved  MD DANNY Calderon 18    Visit Dx:    ICD-10-CM ICD-9-CM   1. Paroxysmal nerve pain M79.2 729.2       Patient Active Problem List   Diagnosis   • Allergic rhinitis due to pollen        Past Medical History:   Diagnosis Date   • Acute sinusitis    • Allergic rhinitis     due to pollen   • Astigmatism    • Closed fracture of phalanx of foot    • Contusion of lower leg     left; RICE and NSAIDS   • Elbow joint pain    • Foot pain    • Fracture of bone    • IgE deficiency (CMS/HCC)     mediated allergic asthma   • Metatarsalgia    • Myopia    • Noncompliance with treatment         Past Surgical History:   Procedure Laterality Date   • ADENOIDECTOMY     • EXTRACORPOREAL SHOCKWAVE LITHOTRIPSY (ESWL), STENT INSERTION/REMOVAL Right 2017    Procedure: RIGHT EXTRACORPOREAL SHOCKWAVE LITHOTRIPSY;  Surgeon: Brayan Peralta MD;  Location: [facility];  Service:    • INJECTION OF MEDICATION      depo medrol   • INJECTION OF MEDICATION      kenalog   • KNEE SURGERY Bilateral    • OTHER SURGICAL HISTORY      T&A (1)      • TONSILLECTOMY                               PT Assessment/Plan     Row Name 18 1057          PT Assessment    Assessment Comments Tolerates Rx well--pain meds helped with tolerance for manual wqork and US seemed to help relax muscle in prep for manual work  -SP        PT Plan    PT Frequency 2x/week  -SP     Predicted Duration of Therapy Intervention (Therapy Eval) 4 weeks  -SP     PT Plan Comments Monitor response with Rx/US manual work and continue per POC CPT codes in chart  -SP       User Key  (r) = Recorded By, (t) = Taken By, (c) = Cosigned By    Initials Name Provider Type    oLra Benedict PTA Physical Therapy Assistant                Modalities      Row Name 08/08/18 0900             Ice    Ice Applied Yes  -SP      Location c spine  -SP      Rx Minutes Other:  -SP      Ice S/P Rx Yes  -SP         Ultrasound 21822    Location Left scapula medial border and L UT  -SP      Duty Cycle 100  -SP      Frequency --   2 Mhz  -SP      Intensity - Wts/cm 1.5  -SP      35467 - PT Ultrasound Minutes --   10 min  -SP         ELECTRICAL STIMULATION    Attended/Unattended Unattended  -SP      Stimulation Type IFC  -SP      Max mAmp 20  -SP      Location/Electrode Placement/Other c spine  -SP        User Key  (r) = Recorded By, (t) = Taken By, (c) = Cosigned By    Initials Name Provider Type    Lora Benedict PTA Physical Therapy Assistant                Exercises     Row Name 08/08/18 1100 08/08/18 0900          Subjective Comments    Subjective Comments  -- Notes that he is feeling some better has taken pain meds this morning--notes that he was having chest pains chest sharp  -SP        Subjective Pain    Able to rate subjective pain?  -- yes  -SP     Pre-Treatment Pain Level  -- 8  -SP     Post-Treatment Pain Level 5  -SP  --       User Key  (r) = Recorded By, (t) = Taken By, (c) = Cosigned By    Initials Name Provider Type    Lora Benedict PTA Physical Therapy Assistant                        Manual Rx (last 36 hours)      Manual Treatments     Row Name 08/08/18 0900             Manual Rx 1    Manual Rx 1 Location cervical distraction  -SP      Manual Rx 1 Duration 10 min  -SP         Manual Rx 2    Manual Rx 2 Location Light cuppinng to Left scapula and L UT area followed by STM and t spine a/p glides  -SP      Manual Rx 2 Duration 15 min  -SP        User Key  (r) = Recorded By, (t) = Taken By, (c) = Cosigned By    Initials Name Provider Type    Lora Benedict PTA Physical Therapy Assistant                PT OP Goals     Row Name 08/08/18 1200 08/08/18 0900       PT Short Term Goals    STG Date to Achieve  -- 08/20/18  -SP    STG 1  -- Pt will be  independent in HEP  -SP    STG 1 Progress  -- Not Met  -SP    STG 2  -- Patient will have an NDI score of 60% or less  -SP    STG 2 Progress  -- Not Met  -SP    STG 3  -- Patient will have a quick Dash score of 75% or less  -SP    STG 3 Progress  -- Not Met  -SP    STG 4  -- Patient will have cervical flexion 30°  -SP    STG 4 Progress  -- Not Met  -SP    STG 5  -- Patient will have cervical extension 15°  -SP    STG 5 Progress  -- Not Met  -SP    STG 6  -- Patient will have cervical lateral flexion left to 20°  -SP    STG 7  -- Patient will Have left cervical rotation to 45°  -SP       Long Term Goals    LTG Date to Achieve  -- 08/27/18  -SP    LTG 1  -- Patient will report pain at rest 7/10 on numerical analogue scale  -SP    LTG 1 Progress  -- Not Met  -SP    LTG 2  -- Patient will have increased  strength to 30 pounds on the left  -SP    LTG 2 Progress  -- Not Met  -SP    LTG 3  -- Patient will have cervical range of motion within functional limits  -SP    LTG 3 Progress  -- Not Met  -SP    LTG 4  -- Patient will manual muscle test shoulder throughout upper extremity 5/5  -SP    LTG 4 Progress  -- Not Met  -SP       Time Calculation    PT Goal Re-Cert Due Date 08/20/18  -SP 08/20/18  -      User Key  (r) = Recorded By, (t) = Taken By, (c) = Cosigned By    Initials Name Provider Type    SP Lora Ayala PTA Physical Therapy Assistant                         Time Calculation:   Start Time: 0945  Stop Time: 1103  Time Calculation (min): 78 min  PT Non-Billable Time (min): 20 min  Total Timed Code Minutes- PT: 58 minute(s)  Therapy Suggested Charges     Code   Minutes Charges    None           Therapy Charges for Today     Code Description Service Date Service Provider Modifiers Qty    46759278605 HC PT THER SUPP EA 15 MIN 8/8/2018 Lora Ayala PTA GP 1    05146032694 HC PT ELECTRICAL STIM UNATTENDED 8/8/2018 Lora Ayala PTA  1    55498719490 HC PT ULTRASOUND EA 15 MIN 8/8/2018 Lora Ayala  PTA GP 1    76490474840  PT MANUAL THERAPY EA 15 MIN 8/8/2018 Lora Ayala, JAKE GP 2                    Lora Ayala PTA  8/8/2018

## 2018-08-10 ENCOUNTER — HOSPITAL ENCOUNTER (OUTPATIENT)
Dept: PHYSICAL THERAPY | Facility: HOSPITAL | Age: 48
Setting detail: THERAPIES SERIES
Discharge: HOME | End: 2018-08-10

## 2018-08-10 DIAGNOSIS — M79.2 PAROXYSMAL NERVE PAIN: Primary | ICD-10-CM

## 2018-08-10 DIAGNOSIS — M79.602 LEFT ARM PAIN: ICD-10-CM

## 2018-08-10 DIAGNOSIS — M54.12 CERVICAL RADICULOPATHY: ICD-10-CM

## 2018-08-10 PROCEDURE — G0283 ELEC STIM OTHER THAN WOUND: HCPCS

## 2018-08-10 PROCEDURE — 97035 APP MDLTY 1+ULTRASOUND EA 15: CPT

## 2018-08-10 PROCEDURE — 97140 MANUAL THERAPY 1/> REGIONS: CPT

## 2018-08-10 NOTE — THERAPY TREATMENT NOTE
Outpatient Physical Therapy Ortho Treatment Note  [facility]     Patient Name: Darvin Hoang  : 1970  MRN: [id number]  Today's Date: 8/10/2018      Visit Date: 08/10/2018     Sub imp min.  Visit  (12)  MD 18  Re 18    Visit Dx:    ICD-10-CM ICD-9-CM   1. Paroxysmal nerve pain M79.2 729.2   2. Left arm pain M79.602 729.5   3. Cervical radiculopathy M54.12 723.4       Patient Active Problem List   Diagnosis   • Allergic rhinitis due to pollen        Past Medical History:   Diagnosis Date   • Acute sinusitis    • Allergic rhinitis     due to pollen   • Astigmatism    • Closed fracture of phalanx of foot    • Contusion of lower leg     left; RICE and NSAIDS   • Elbow joint pain    • Foot pain    • Fracture of bone    • IgE deficiency (CMS/HCC)     mediated allergic asthma   • Metatarsalgia    • Myopia    • Noncompliance with treatment         Past Surgical History:   Procedure Laterality Date   • ADENOIDECTOMY     • EXTRACORPOREAL SHOCKWAVE LITHOTRIPSY (ESWL), STENT INSERTION/REMOVAL Right 2017    Procedure: RIGHT EXTRACORPOREAL SHOCKWAVE LITHOTRIPSY;  Surgeon: Brayan Peralta MD;  Location: [facility];  Service:    • INJECTION OF MEDICATION      depo medrol   • INJECTION OF MEDICATION      kenalog   • KNEE SURGERY Bilateral    • OTHER SURGICAL HISTORY      T&A (1)      • TONSILLECTOMY                               PT Assessment/Plan     Row Name 08/10/18 1058          PT Assessment    Assessment Comments (P)  Thuy Rx well. Temporary relief (mild) post US and Manual.   -        PT Plan    PT Frequency (P)  2x/week  -     Predicted Duration of Therapy Intervention (Therapy Eval) (P)  4 weeks  -     PT Plan Comments (P)  MD note next. MD apt post PT visit  -       User Key  (r) = Recorded By, (t) = Taken By, (c) = Cosigned By    Initials Name Provider Type    Tay Adams, ATC                 Modalities     Row Name 08/10/18 1000             Ice    Ice  Applied (P)  Yes  -SABA      Location (P)  c spine/L scapular  -SABA      Rx Minutes (P)  Other:   20'  -SABA      Ice S/P Rx (P)  Yes  -SABA         Ultrasound 05511    Location (P)  Left scapula medial border and L UT  -SABA      Duty Cycle (P)  100  -SABA      Frequency (P)  --   2 Mhz  -SABA      Intensity - Wts/cm (P)  1.5  -SABA      10746 - PT Ultrasound Minutes (P)  8  -SABA         ELECTRICAL STIMULATION    Attended/Unattended (P)  Unattended  -SABA      Stimulation Type (P)  IFC  -SABA      Max mAmp (P)  20  -SABA      Location/Electrode Placement/Other (P)  c spine  -SABA      27296 - PT Electrical Stimulation (Manual) Minutes (P)  20  -SABA        User Key  (r) = Recorded By, (t) = Taken By, (c) = Cosigned By    Initials Name Provider Type    SABA Tay Mejia, [facility]                 Exercises     Row Name 08/10/18 1000             Subjective Comments    Subjective Comments (P)  Reports took pain med about 4 hours ago. Starting to wear off. Can feel the stabbing pain again.  -SABA         Subjective Pain    Able to rate subjective pain? (P)  yes  -SABA      Pre-Treatment Pain Level (P)  7  -SABA      Post-Treatment Pain Level (P)  5  -SABA         Exercise 1    Exercise Name 1 (P)  See Manual  -SABA         Exercise 2    Exercise Name 2 (P)  See modalities  -SABA        User Key  (r) = Recorded By, (t) = Taken By, (c) = Cosigned By    Initials Name Provider Type    SABA Tay Mejia, [facility]                         Manual Rx (last 36 hours)      Manual Treatments     Row Name 08/10/18 1000             Manual Rx 1    Manual Rx 1 Location (P)  cervical distraction  -SABA      Manual Rx 1 Duration (P)  10 min  -SABA         Manual Rx 2    Manual Rx 2 Location (P)  Light cuppinng to Left scapula and L UT area followed by STM and t spine a/p glides  -SABA      Manual Rx 2 Duration (P)  5  -SABA         Manual Rx 3    Manual Rx 3 Location (P)  Cervical L MFR  -SABA      Manual Rx 3 Duration (P)  5  -SABA        User Key  (r) = Recorded  By, (t) = Taken By, (c) = Cosigned By    Initials Name Provider Type    Tay Adams, ATC                 PT OP Goals     Row Name 08/10/18 1000          PT Short Term Goals    STG Date to Achieve (P)  08/20/18  -SABA     STG 1 (P)  Pt will be independent in HEP  -SABA     STG 1 Progress (P)  Not Met  -SABA     STG 2 (P)  Patient will have an NDI score of 60% or less  -SABA     STG 2 Progress (P)  Not Met  -SABA     STG 3 (P)  Patient will have a quick Dash score of 75% or less  -SABA     STG 3 Progress (P)  Not Met  -SABA     STG 4 (P)  Patient will have cervical flexion 30°  -SABA     STG 4 Progress (P)  Not Met  -SABA     STG 5 (P)  Patient will have cervical extension 15°  -SABA     STG 5 Progress (P)  Not Met  -SABA     STG 6 (P)  Patient will have cervical lateral flexion left to 20°  -SABA     STG 7 (P)  Patient will Have left cervical rotation to 45°  -        Long Term Goals    LTG Date to Achieve (P)  08/27/18  -SABA     LTG 1 (P)  Patient will report pain at rest 7/10 on numerical analogue scale  -SABA     LTG 1 Progress (P)  Not Met  -SABA     LTG 2 (P)  Patient will have increased  strength to 30 pounds on the left  -SABA     LTG 2 Progress (P)  Not Met  -SABA     LTG 3 (P)  Patient will have cervical range of motion within functional limits  -SABA     LTG 3 Progress (P)  Not Met  -SABA     LTG 4 (P)  Patient will manual muscle test shoulder throughout upper extremity 5/5  -SABA     LTG 4 Progress (P)  Not Met  -SABA       User Key  (r) = Recorded By, (t) = Taken By, (c) = Cosigned By    Initials Name Provider Type    Tay Adams, ATC           Therapy Education  Given: (P) HEP  Program: (P) Reinforced  How Provided: (P) Verbal  Provided to: (P) Patient  Level of Understanding: (P) Verbalized              Time Calculation:   Start Time: (P) 1020  Stop Time: (P) 1118  Time Calculation (min): (P) 58 min  Total Timed Code Minutes- PT: (P) 38 minute(s)  Therapy Suggested Charges     Code   Minutes  Charges    79319 (CPT®) Hc Pt Neuromusc Re Education Ea 15 Min      16950 (CPT®) Hc Pt Ther Proc Ea 15 Min      68290 (CPT®) Hc Gait Training Ea 15 Min      18272 (CPT®) Hc Pt Therapeutic Act Ea 15 Min      27449 (CPT®) Hc Pt Manual Therapy Ea 15 Min      38689 (CPT®) Hc Pt Ther Massage- Per 15 Min      18393 (CPT®) Hc Pt Iontophoresis Ea 15 Min      11744 (CPT®) Hc Pt Elec Stim Ea-Per 15 Min 20 1    65889 (CPT®) Hc Pt Ultrasound Ea 15 Min 8 1    59276 (CPT®) Hc Pt Self Care/Mgmt/Train Ea 15 Min      Total  28 2        Therapy Charges for Today     Code Description Service Date Service Provider Modifiers Qty    50350732601 HC PT ELECTRICAL STIM UNATTENDED 8/10/2018 Tay Mejia, ATC  1    83207779501 HC PT ULTRASOUND EA 15 MIN 8/10/2018 Tay Mejia, ATC  1    69111534813 HC PT MANUAL THERAPY EA 15 MIN 8/10/2018 Tay Mejia, ATC  2                    Tay Mejia, ATC  8/10/2018

## 2018-08-13 ENCOUNTER — CLINICAL SUPPORT (OUTPATIENT)
Dept: PULMONOLOGY | Facility: CLINIC | Age: 48
End: 2018-08-13

## 2018-08-13 ENCOUNTER — HOSPITAL ENCOUNTER (OUTPATIENT)
Dept: PHYSICAL THERAPY | Facility: HOSPITAL | Age: 48
Setting detail: THERAPIES SERIES
Discharge: HOME | End: 2018-08-13

## 2018-08-13 DIAGNOSIS — M79.2 PAROXYSMAL NERVE PAIN: Primary | ICD-10-CM

## 2018-08-13 DIAGNOSIS — M79.602 LEFT ARM PAIN: ICD-10-CM

## 2018-08-13 DIAGNOSIS — M54.12 CERVICAL RADICULOPATHY: ICD-10-CM

## 2018-08-13 DIAGNOSIS — J30.2 SEASONAL ALLERGIC RHINITIS, UNSPECIFIED CHRONICITY, UNSPECIFIED TRIGGER: Primary | ICD-10-CM

## 2018-08-13 PROCEDURE — G0283 ELEC STIM OTHER THAN WOUND: HCPCS

## 2018-08-13 PROCEDURE — 97035 APP MDLTY 1+ULTRASOUND EA 15: CPT

## 2018-08-13 PROCEDURE — 97140 MANUAL THERAPY 1/> REGIONS: CPT

## 2018-08-13 PROCEDURE — 95115 IMMUNOTHERAPY ONE INJECTION: CPT | Performed by: INTERNAL MEDICINE

## 2018-08-13 NOTE — THERAPY TREATMENT NOTE
Outpatient Physical Therapy Ortho Treatment Note  [facility]     Patient Name: Darvin Hoang  : 1970  MRN: [id number]  Today's Date: 2018      Visit Date: 2018     Sub imp 0%  Visit  (12)  MD today  Re 18    MD note sent with patient.  Supervised by SLIME Hutton PT start-1100 and ROMARIO Nicholson PTA 1100-finish    Visit Dx:    ICD-10-CM ICD-9-CM   1. Paroxysmal nerve pain M79.2 729.2   2. Left arm pain M79.602 729.5   3. Cervical radiculopathy M54.12 723.4       Patient Active Problem List   Diagnosis   • Allergic rhinitis due to pollen        Past Medical History:   Diagnosis Date   • Acute sinusitis    • Allergic rhinitis     due to pollen   • Astigmatism    • Closed fracture of phalanx of foot    • Contusion of lower leg     left; RICE and NSAIDS   • Elbow joint pain    • Foot pain    • Fracture of bone    • IgE deficiency (CMS/HCC)     mediated allergic asthma   • Metatarsalgia    • Myopia    • Noncompliance with treatment         Past Surgical History:   Procedure Laterality Date   • ADENOIDECTOMY     • EXTRACORPOREAL SHOCKWAVE LITHOTRIPSY (ESWL), STENT INSERTION/REMOVAL Right 2017    Procedure: RIGHT EXTRACORPOREAL SHOCKWAVE LITHOTRIPSY;  Surgeon: Brayan Peralta MD;  Location: [facility];  Service:    • INJECTION OF MEDICATION      depo medrol   • INJECTION OF MEDICATION      kenalog   • KNEE SURGERY Bilateral    • OTHER SURGICAL HISTORY      T&A (1)      • TONSILLECTOMY                               PT Assessment/Plan     Row Name 18 1118          PT Assessment    Assessment Comments (P)  MD note sent with patient.   -SABA        PT Plan    PT Frequency (P)  3x/week  -SABA     Predicted Duration of Therapy Intervention (Therapy Eval) (P)  4 weeks  -SABA     PT Plan Comments (P)  Pending MD apt this afternoon.   -SABA       User Key  (r) = Recorded By, (t) = Taken By, (c) = Cosigned By    Initials Name Provider Type    Tay Adams, ATC                  Modalities     Row Name 08/13/18 1000             Ice    Location (P)  c spine/L scapular  -SABA      Rx Minutes (P)  Other:   20'  -SABA      Ice S/P Rx (P)  Yes  -         Ultrasound 48315    Location (P)  Left scapula medial border and L UT  -      Duty Cycle (P)  100  -SABA      Frequency (P)  --   2 Mhz  -SABA      Intensity - Wts/cm (P)  1.5  -SABA      01482 - PT Ultrasound Minutes (P)  8  -SABA         ELECTRICAL STIMULATION    Attended/Unattended (P)  Unattended  -SABA      Stimulation Type (P)  IFC  -SABA      Max mAmp (P)  20  -SABA      Location/Electrode Placement/Other (P)  c spine  -SABA      56095 - PT Electrical Stimulation (Manual) Minutes (P)  20  -SABA        User Key  (r) = Recorded By, (t) = Taken By, (c) = Cosigned By    Initials Name Provider Type    Tay Adams, [facility]                 Exercises     Row Name 08/13/18 1100             Subjective Comments    Subjective Comments (P)  Reports continued pain. Pain meds are wearing off. Gets slight temporary relief from PT of 1-2 hours  -         Subjective Pain    Pre-Treatment Pain Level (P)  8  -SABA        User Key  (r) = Recorded By, (t) = Taken By, (c) = Cosigned By    Initials Name Provider Type    Tay Adams, [facility]                         Manual Rx (last 36 hours)      Manual Treatments     Row Name 08/13/18 1000             Manual Rx 1    Manual Rx 1 Location (P)  cervical distraction  -      Manual Rx 1 Duration (P)  15 min  -         Manual Rx 2    Manual Rx 2 Location (P)  Light cuppinng to Left scapula and L UT area followed by STM and t spine a/p glides  -SABA      Manual Rx 2 Duration (P)  5  -SABA         Manual Rx 3    Manual Rx 3 Location (P)  Cervical L MFR  -SABA      Manual Rx 3 Duration (P)  5  -SABA        User Key  (r) = Recorded By, (t) = Taken By, (c) = Cosigned By    Initials Name Provider Type    Tay Adams, [employer]                 PT OP Goals     Row Name 08/13/18 1000          PT  Short Term Goals    STG Date to Achieve (P)  08/20/18  -SABA     STG 1 (P)  Pt will be independent in HEP  -SABA     STG 1 Progress (P)  Not Met  -SABA     STG 2 (P)  Patient will have an NDI score of 60% or less  -SABA     STG 2 Progress (P)  Not Met  -SABA     STG 3 (P)  Patient will have a quick Dash score of 75% or less  -SABA     STG 3 Progress (P)  Not Met  -SABA     STG 4 (P)  Patient will have cervical flexion 30°  -SABA     STG 4 Progress (P)  Not Met  -SABA     STG 5 (P)  Patient will have cervical extension 15°  -SABA     STG 5 Progress (P)  Not Met  -SABA     STG 6 (P)  Patient will have cervical lateral flexion left to 20°  -SABA     STG 7 (P)  Patient will Have left cervical rotation to 45°  -        Long Term Goals    LTG Date to Achieve (P)  08/27/18  -SABA     LTG 1 (P)  Patient will report pain at rest 7/10 on numerical analogue scale  -SABA     LTG 1 Progress (P)  Not Met  -SABA     LTG 2 (P)  Patient will have increased  strength to 30 pounds on the left  -SABA     LTG 2 Progress (P)  Not Met  -SABA     LTG 3 (P)  Patient will have cervical range of motion within functional limits  -     LTG 3 Progress (P)  Not Met  -SABA     LTG 4 (P)  Patient will manual muscle test shoulder throughout upper extremity 5/5  -SABA     LTG 4 Progress (P)  Not Met  -SABA       User Key  (r) = Recorded By, (t) = Taken By, (c) = Cosigned By    Initials Name Provider Type    Tay Adams, ATC           Therapy Education  Given: (P) HEP  Program: (P) Reinforced  How Provided: (P) Verbal  Provided to: (P) Patient  Level of Understanding: (P) Verbalized              Time Calculation:   Start Time: (P) 1030  Stop Time: (P) 1129  Time Calculation (min): (P) 59 min  Total Timed Code Minutes- PT: (P) 39 minute(s)  Therapy Suggested Charges     Code   Minutes Charges    17565 (CPT®) Hc Pt Neuromusc Re Education Ea 15 Min      30857 (CPT®) Hc Pt Ther Proc Ea 15 Min      46017 (CPT®) Hc Gait Training Ea 15 Min      41910 (CPT®) Hc Pt  Therapeutic Act Ea 15 Min      94964 (CPT®) Hc Pt Manual Therapy Ea 15 Min      07390 (CPT®) Hc Pt Ther Massage- Per 15 Min      22818 (CPT®) Hc Pt Iontophoresis Ea 15 Min      15496 (CPT®) Hc Pt Elec Stim Ea-Per 15 Min 20 1    86652 (CPT®) Hc Pt Ultrasound Ea 15 Min 8 1    45523 (CPT®) Hc Pt Self Care/Mgmt/Train Ea 15 Min      Total  28 2        Therapy Charges for Today     Code Description Service Date Service Provider Modifiers Qty    79762159559 HC PT ULTRASOUND EA 15 MIN 8/13/2018 Tay Mejia, ATC  1    12939155175 HC PT MANUAL THERAPY EA 15 MIN 8/13/2018 Tay Mejia, ATC  2    09712805174 HC PT ELECTRICAL STIM UNATTENDED 8/13/2018 Tay Mejia, ATC  1    96748034906 HC PT THER SUPP EA 15 MIN 8/13/2018 Tay Mejia, ATC  1                    Tay Mejia, ATC  8/13/2018

## 2018-08-15 ENCOUNTER — APPOINTMENT (OUTPATIENT)
Dept: PHYSICAL THERAPY | Facility: HOSPITAL | Age: 48
End: 2018-08-15

## 2018-08-17 ENCOUNTER — APPOINTMENT (OUTPATIENT)
Dept: PHYSICAL THERAPY | Facility: HOSPITAL | Age: 48
End: 2018-08-17

## 2018-08-20 ENCOUNTER — CLINICAL SUPPORT (OUTPATIENT)
Dept: PULMONOLOGY | Facility: CLINIC | Age: 48
End: 2018-08-20

## 2018-08-20 DIAGNOSIS — J30.2 SEASONAL ALLERGIC RHINITIS, UNSPECIFIED CHRONICITY, UNSPECIFIED TRIGGER: Primary | ICD-10-CM

## 2018-08-20 PROCEDURE — 95115 IMMUNOTHERAPY ONE INJECTION: CPT | Performed by: INTERNAL MEDICINE

## 2018-08-27 ENCOUNTER — CLINICAL SUPPORT (OUTPATIENT)
Dept: PULMONOLOGY | Facility: CLINIC | Age: 48
End: 2018-08-27

## 2018-08-27 DIAGNOSIS — J30.2 SEASONAL ALLERGIC RHINITIS, UNSPECIFIED CHRONICITY, UNSPECIFIED TRIGGER: Primary | ICD-10-CM

## 2018-08-27 PROCEDURE — 95115 IMMUNOTHERAPY ONE INJECTION: CPT | Performed by: INTERNAL MEDICINE

## 2018-09-06 ENCOUNTER — CLINICAL SUPPORT (OUTPATIENT)
Dept: PULMONOLOGY | Facility: CLINIC | Age: 48
End: 2018-09-06

## 2018-09-06 DIAGNOSIS — J30.2 SEASONAL ALLERGIC RHINITIS, UNSPECIFIED CHRONICITY, UNSPECIFIED TRIGGER: Primary | ICD-10-CM

## 2018-09-06 PROCEDURE — 95115 IMMUNOTHERAPY ONE INJECTION: CPT | Performed by: INTERNAL MEDICINE

## 2018-09-10 ENCOUNTER — CLINICAL SUPPORT (OUTPATIENT)
Dept: PULMONOLOGY | Facility: CLINIC | Age: 48
End: 2018-09-10

## 2018-09-10 DIAGNOSIS — J30.2 SEASONAL ALLERGIC RHINITIS, UNSPECIFIED CHRONICITY, UNSPECIFIED TRIGGER: Primary | ICD-10-CM

## 2018-09-10 PROCEDURE — 95115 IMMUNOTHERAPY ONE INJECTION: CPT | Performed by: INTERNAL MEDICINE

## 2018-09-17 ENCOUNTER — CLINICAL SUPPORT (OUTPATIENT)
Dept: PULMONOLOGY | Facility: CLINIC | Age: 48
End: 2018-09-17

## 2018-09-17 DIAGNOSIS — J30.2 SEASONAL ALLERGIC RHINITIS, UNSPECIFIED CHRONICITY, UNSPECIFIED TRIGGER: Primary | ICD-10-CM

## 2018-09-17 PROCEDURE — 95115 IMMUNOTHERAPY ONE INJECTION: CPT | Performed by: INTERNAL MEDICINE

## 2018-09-24 ENCOUNTER — CLINICAL SUPPORT (OUTPATIENT)
Dept: PULMONOLOGY | Facility: CLINIC | Age: 48
End: 2018-09-24

## 2018-09-24 DIAGNOSIS — J30.2 SEASONAL ALLERGIC RHINITIS, UNSPECIFIED CHRONICITY, UNSPECIFIED TRIGGER: Primary | ICD-10-CM

## 2018-09-24 PROCEDURE — 95115 IMMUNOTHERAPY ONE INJECTION: CPT | Performed by: INTERNAL MEDICINE

## 2018-10-01 ENCOUNTER — CLINICAL SUPPORT (OUTPATIENT)
Dept: PULMONOLOGY | Facility: CLINIC | Age: 48
End: 2018-10-01

## 2018-10-01 DIAGNOSIS — J30.2 SEASONAL ALLERGIC RHINITIS, UNSPECIFIED TRIGGER: Primary | ICD-10-CM

## 2018-10-01 PROCEDURE — 95115 IMMUNOTHERAPY ONE INJECTION: CPT | Performed by: INTERNAL MEDICINE

## 2018-10-08 ENCOUNTER — TELEPHONE (OUTPATIENT)
Dept: PULMONOLOGY | Facility: CLINIC | Age: 48
End: 2018-10-08

## 2018-10-08 NOTE — TELEPHONE ENCOUNTER
Spoke with pt @8:37am to let pt know that we can not do allergy shots this week due to Dr Romero being out of town, He also is the father of Michelle and Sherron that gets allergy shots as well All pt are aware of the allergy shots. Pt also stated that his daughter Michelle had a reaction to the allergy shot last week.

## 2018-10-15 ENCOUNTER — CLINICAL SUPPORT (OUTPATIENT)
Dept: PULMONOLOGY | Facility: CLINIC | Age: 48
End: 2018-10-15

## 2018-10-15 DIAGNOSIS — J30.2 SEASONAL ALLERGIC RHINITIS, UNSPECIFIED TRIGGER: Primary | ICD-10-CM

## 2018-10-15 PROCEDURE — 95115 IMMUNOTHERAPY ONE INJECTION: CPT | Performed by: INTERNAL MEDICINE

## 2018-10-23 ENCOUNTER — CLINICAL SUPPORT (OUTPATIENT)
Dept: PULMONOLOGY | Facility: CLINIC | Age: 48
End: 2018-10-23

## 2018-10-23 DIAGNOSIS — J30.2 SEASONAL ALLERGIC RHINITIS, UNSPECIFIED TRIGGER: Primary | ICD-10-CM

## 2018-10-23 PROCEDURE — 95115 IMMUNOTHERAPY ONE INJECTION: CPT | Performed by: INTERNAL MEDICINE

## 2018-11-08 ENCOUNTER — CLINICAL SUPPORT (OUTPATIENT)
Dept: PULMONOLOGY | Facility: CLINIC | Age: 48
End: 2018-11-08

## 2018-11-08 DIAGNOSIS — J30.2 SEASONAL ALLERGIC RHINITIS, UNSPECIFIED TRIGGER: Primary | ICD-10-CM

## 2018-11-08 PROCEDURE — 95115 IMMUNOTHERAPY ONE INJECTION: CPT | Performed by: INTERNAL MEDICINE

## 2018-11-12 ENCOUNTER — OFFICE VISIT (OUTPATIENT)
Dept: PULMONOLOGY | Facility: CLINIC | Age: 48
End: 2018-11-12

## 2018-11-12 VITALS
WEIGHT: 244.6 LBS | SYSTOLIC BLOOD PRESSURE: 138 MMHG | HEART RATE: 67 BPM | DIASTOLIC BLOOD PRESSURE: 83 MMHG | OXYGEN SATURATION: 98 % | HEIGHT: 73 IN | BODY MASS INDEX: 32.42 KG/M2

## 2018-11-12 DIAGNOSIS — J30.1 ALLERGIC RHINITIS DUE TO POLLEN, UNSPECIFIED SEASONALITY: Primary | ICD-10-CM

## 2018-11-12 PROCEDURE — 99212 OFFICE O/P EST SF 10 MIN: CPT | Performed by: INTERNAL MEDICINE

## 2018-11-12 NOTE — PROGRESS NOTES
"This 48-year-old gentleman has allergic rhinitis .  He takes immunotherapy with good results.  He is not coughing or wheezing    ROS    Constitutional-no night sweats weight loss headaches  GI no abdominal pain nausea or diarrhea  Neuro no seizure or neurologic deficits  Musculoskeletal no deformity or joint pain   no dysuria or hematuria  Skin no rash or other lesions  All other systems reviewed and were negative except for the above.      Physical Exam  /83   Pulse 67   Ht 185.4 cm (73\")   Wt 111 kg (244 lb 9.6 oz)   SpO2 98%   BMI 32.27 kg/m²   Vital signs as above  Pupils equally round and reactive to light and accommodation, neck no JVD or adenopathy.  Cardiovascular regular rhythm and rate no murmur or gallop.  Abdomen soft no organomegaly tenderness.  Extremities no clubbing cyanosis or edema.  No cervical adenopathy.  No skin rash.  Neurologic good strength bilaterally without deficits  Patient is alert no distress nose throat and lungs are clear    Impression allergic rhinitis improved    Plan continue present medications, return in 6 months, continue immunotherapy        This document has been produced with the assistance of [employer]  This document has been electronically signed by Trenton Romero MD on November 12, 2018 2:50 PM      "

## 2018-11-14 ENCOUNTER — CLINICAL SUPPORT (OUTPATIENT)
Dept: PULMONOLOGY | Facility: CLINIC | Age: 48
End: 2018-11-14

## 2018-11-14 DIAGNOSIS — J30.2 SEASONAL ALLERGIC RHINITIS, UNSPECIFIED TRIGGER: Primary | ICD-10-CM

## 2018-11-14 PROCEDURE — 95115 IMMUNOTHERAPY ONE INJECTION: CPT | Performed by: INTERNAL MEDICINE

## 2018-11-27 ENCOUNTER — CLINICAL SUPPORT (OUTPATIENT)
Dept: PULMONOLOGY | Facility: CLINIC | Age: 48
End: 2018-11-27

## 2018-11-27 DIAGNOSIS — J30.2 SEASONAL ALLERGIC RHINITIS, UNSPECIFIED TRIGGER: Primary | ICD-10-CM

## 2018-11-27 PROCEDURE — 95115 IMMUNOTHERAPY ONE INJECTION: CPT | Performed by: INTERNAL MEDICINE

## 2019-01-07 ENCOUNTER — CLINICAL SUPPORT (OUTPATIENT)
Dept: PULMONOLOGY | Facility: CLINIC | Age: 49
End: 2019-01-07

## 2019-01-07 DIAGNOSIS — J30.2 SEASONAL ALLERGIC RHINITIS, UNSPECIFIED TRIGGER: Primary | ICD-10-CM

## 2019-01-07 PROCEDURE — 95115 IMMUNOTHERAPY ONE INJECTION: CPT | Performed by: INTERNAL MEDICINE

## 2019-01-14 ENCOUNTER — CLINICAL SUPPORT (OUTPATIENT)
Dept: PULMONOLOGY | Facility: CLINIC | Age: 49
End: 2019-01-14

## 2019-01-14 DIAGNOSIS — J30.2 SEASONAL ALLERGIC RHINITIS, UNSPECIFIED TRIGGER: Primary | ICD-10-CM

## 2019-01-14 PROCEDURE — 95115 IMMUNOTHERAPY ONE INJECTION: CPT | Performed by: INTERNAL MEDICINE

## 2019-01-22 ENCOUNTER — CLINICAL SUPPORT (OUTPATIENT)
Dept: PULMONOLOGY | Facility: CLINIC | Age: 49
End: 2019-01-22

## 2019-01-22 DIAGNOSIS — J30.1 ALLERGIC RHINITIS DUE TO POLLEN, UNSPECIFIED SEASONALITY: Primary | ICD-10-CM

## 2019-01-22 PROCEDURE — 95115 IMMUNOTHERAPY ONE INJECTION: CPT | Performed by: INTERNAL MEDICINE

## 2019-01-28 ENCOUNTER — OFFICE VISIT (OUTPATIENT)
Dept: PULMONOLOGY | Facility: CLINIC | Age: 49
End: 2019-01-28

## 2019-01-28 VITALS
OXYGEN SATURATION: 98 % | WEIGHT: 249.4 LBS | HEIGHT: 73 IN | DIASTOLIC BLOOD PRESSURE: 80 MMHG | SYSTOLIC BLOOD PRESSURE: 137 MMHG | HEART RATE: 94 BPM | BODY MASS INDEX: 33.05 KG/M2

## 2019-01-28 DIAGNOSIS — J30.1 ALLERGIC RHINITIS DUE TO POLLEN, UNSPECIFIED SEASONALITY: Primary | ICD-10-CM

## 2019-01-28 PROCEDURE — 99213 OFFICE O/P EST LOW 20 MIN: CPT | Performed by: INTERNAL MEDICINE

## 2019-01-28 NOTE — PROGRESS NOTES
"This gentleman has long-standing allergic rhinitis and has episodes of sneezing and nasal congestion despite medications and immunotherapy he is not coughing or wheezing    ROS    Constitutional-no night sweats weight loss headaches  GI no abdominal pain nausea or diarrhea  Neuro no seizure or neurologic deficits  Musculoskeletal no deformity or joint pain   no dysuria or hematuria  Skin no rash or other lesions  All other systems reviewed and were negative except for the above.      Physical Exam  /80 (BP Location: Right arm, Patient Position: Sitting, Cuff Size: Adult)   Pulse 94   Ht 185.4 cm (73\")   Wt 113 kg (249 lb 6.4 oz)   SpO2 98%   BMI 32.90 kg/m²   Vital signs as above  Pupils equally round and reactive to light and accommodation, neck no JVD or adenopathy.  Cardiovascular regular rhythm and rate no murmur or gallop.  Abdomen soft no organomegaly tenderness.  Extremities no clubbing cyanosis or edema.  No cervical adenopathy.  No skin rash.  Neurologic good strength bilaterally without deficits  Nose is mildly congested lungs are clear    Impression allergic rhinitis    Plan continue present medications.  This organization has decided to no longer participate in immunotherapy so this gentleman was advised to seek use allergy shots elsewhere.  Return as needed        This document has been produced with the assistance of Dragon dictation  This document has been electronically signed by Trenton Romero MD on January 28, 2019 3:13 PM      "

## 2022-02-11 ENCOUNTER — TRANSCRIBE ORDERS (OUTPATIENT)
Dept: ORTHOPEDIC SURGERY | Facility: CLINIC | Age: 52
End: 2022-02-11

## 2022-02-11 DIAGNOSIS — M25.562 LEFT KNEE PAIN, UNSPECIFIED CHRONICITY: Primary | ICD-10-CM

## (undated) DEVICE — CONTAINER,SPECIMEN,OR STERILE,4OZ: Brand: MEDLINE

## (undated) DEVICE — SOL IRRG H2O PL/BG 1000ML STRL

## (undated) DEVICE — SOL IRR H2O BTL 1000ML STRL

## (undated) DEVICE — PK CYSTO LF 60

## (undated) DEVICE — GLV SURG NEOLON 2G PF LF 6.5 STRL

## (undated) DEVICE — GLV SURG NEOLON 2G PF LF 7.5 STRL